# Patient Record
Sex: MALE | Race: OTHER | HISPANIC OR LATINO | Employment: OTHER | URBAN - METROPOLITAN AREA
[De-identification: names, ages, dates, MRNs, and addresses within clinical notes are randomized per-mention and may not be internally consistent; named-entity substitution may affect disease eponyms.]

---

## 2024-08-31 ENCOUNTER — HOSPITAL ENCOUNTER (OUTPATIENT)
Dept: RADIOLOGY | Facility: MEDICAL CENTER | Age: 49
End: 2024-08-31

## 2024-08-31 ENCOUNTER — HOSPITAL ENCOUNTER (INPATIENT)
Facility: MEDICAL CENTER | Age: 49
End: 2024-08-31
Attending: STUDENT IN AN ORGANIZED HEALTH CARE EDUCATION/TRAINING PROGRAM | Admitting: STUDENT IN AN ORGANIZED HEALTH CARE EDUCATION/TRAINING PROGRAM
Payer: COMMERCIAL

## 2024-08-31 ENCOUNTER — TELEPHONE (OUTPATIENT)
Dept: CARDIOLOGY | Facility: MEDICAL CENTER | Age: 49
End: 2024-08-31

## 2024-08-31 ENCOUNTER — APPOINTMENT (OUTPATIENT)
Dept: RADIOLOGY | Facility: MEDICAL CENTER | Age: 49
DRG: 322 | End: 2024-08-31
Attending: STUDENT IN AN ORGANIZED HEALTH CARE EDUCATION/TRAINING PROGRAM

## 2024-08-31 VITALS
RESPIRATION RATE: 18 BRPM | HEART RATE: 77 BPM | WEIGHT: 155.87 LBS | TEMPERATURE: 98.1 F | DIASTOLIC BLOOD PRESSURE: 97 MMHG | HEIGHT: 63 IN | OXYGEN SATURATION: 93 % | BODY MASS INDEX: 27.62 KG/M2 | SYSTOLIC BLOOD PRESSURE: 143 MMHG

## 2024-08-31 DIAGNOSIS — I21.4 NSTEMI (NON-ST ELEVATED MYOCARDIAL INFARCTION) (HCC): ICD-10-CM

## 2024-08-31 DIAGNOSIS — I24.9 ACUTE CORONARY SYNDROME (HCC): ICD-10-CM

## 2024-08-31 PROBLEM — I10 HYPERTENSION: Status: ACTIVE | Noted: 2024-08-31

## 2024-08-31 PROBLEM — Z71.6 TOBACCO ABUSE COUNSELING: Status: ACTIVE | Noted: 2024-08-31

## 2024-08-31 LAB
ALBUMIN SERPL BCP-MCNC: 3.9 G/DL (ref 3.2–4.9)
ALBUMIN/GLOB SERPL: 1 G/DL
ALP SERPL-CCNC: 96 U/L (ref 30–99)
ALT SERPL-CCNC: 32 U/L (ref 2–50)
AMPHET UR QL SCN: NEGATIVE
ANION GAP SERPL CALC-SCNC: 12 MMOL/L (ref 7–16)
APPEARANCE UR: CLEAR
APTT PPP: 25.1 SEC (ref 24.7–36)
AST SERPL-CCNC: 26 U/L (ref 12–45)
BARBITURATES UR QL SCN: NEGATIVE
BASOPHILS # BLD AUTO: 0.6 % (ref 0–1.8)
BASOPHILS # BLD: 0.06 K/UL (ref 0–0.12)
BENZODIAZ UR QL SCN: POSITIVE
BILIRUB SERPL-MCNC: 0.3 MG/DL (ref 0.1–1.5)
BILIRUB UR QL STRIP.AUTO: NEGATIVE
BUN SERPL-MCNC: 14 MG/DL (ref 8–22)
BZE UR QL SCN: NEGATIVE
CALCIUM ALBUM COR SERPL-MCNC: 9.4 MG/DL (ref 8.5–10.5)
CALCIUM SERPL-MCNC: 9.3 MG/DL (ref 8.5–10.5)
CANNABINOIDS UR QL SCN: NEGATIVE
CHLORIDE SERPL-SCNC: 106 MMOL/L (ref 96–112)
CO2 SERPL-SCNC: 23 MMOL/L (ref 20–33)
COLOR UR: YELLOW
CREAT SERPL-MCNC: 0.78 MG/DL (ref 0.5–1.4)
EKG IMPRESSION: NORMAL
EOSINOPHIL # BLD AUTO: 0.47 K/UL (ref 0–0.51)
EOSINOPHIL NFR BLD: 4.7 % (ref 0–6.9)
ERYTHROCYTE [DISTWIDTH] IN BLOOD BY AUTOMATED COUNT: 43.1 FL (ref 35.9–50)
FENTANYL UR QL: NEGATIVE
GFR SERPLBLD CREATININE-BSD FMLA CKD-EPI: 109 ML/MIN/1.73 M 2
GLOBULIN SER CALC-MCNC: 3.8 G/DL (ref 1.9–3.5)
GLUCOSE SERPL-MCNC: 96 MG/DL (ref 65–99)
GLUCOSE UR STRIP.AUTO-MCNC: NEGATIVE MG/DL
HCT VFR BLD AUTO: 44.5 % (ref 42–52)
HGB BLD-MCNC: 15.3 G/DL (ref 14–18)
IMM GRANULOCYTES # BLD AUTO: 0.04 K/UL (ref 0–0.11)
IMM GRANULOCYTES NFR BLD AUTO: 0.4 % (ref 0–0.9)
INR PPP: 0.98 (ref 0.87–1.13)
KETONES UR STRIP.AUTO-MCNC: NEGATIVE MG/DL
LEUKOCYTE ESTERASE UR QL STRIP.AUTO: NEGATIVE
LYMPHOCYTES # BLD AUTO: 1.81 K/UL (ref 1–4.8)
LYMPHOCYTES NFR BLD: 17.9 % (ref 22–41)
MCH RBC QN AUTO: 30.8 PG (ref 27–33)
MCHC RBC AUTO-ENTMCNC: 34.4 G/DL (ref 32.3–36.5)
MCV RBC AUTO: 89.7 FL (ref 81.4–97.8)
METHADONE UR QL SCN: NEGATIVE
MICRO URNS: NORMAL
MONOCYTES # BLD AUTO: 0.81 K/UL (ref 0–0.85)
MONOCYTES NFR BLD AUTO: 8 % (ref 0–13.4)
NEUTROPHILS # BLD AUTO: 6.9 K/UL (ref 1.82–7.42)
NEUTROPHILS NFR BLD: 68.4 % (ref 44–72)
NITRITE UR QL STRIP.AUTO: NEGATIVE
NRBC # BLD AUTO: 0 K/UL
NRBC BLD-RTO: 0 /100 WBC (ref 0–0.2)
NT-PROBNP SERPL IA-MCNC: <36 PG/ML (ref 0–125)
OPIATES UR QL SCN: NEGATIVE
OXYCODONE UR QL SCN: NEGATIVE
PCP UR QL SCN: NEGATIVE
PH UR STRIP.AUTO: 7.5 [PH] (ref 5–8)
PLATELET # BLD AUTO: 279 K/UL (ref 164–446)
PMV BLD AUTO: 9 FL (ref 9–12.9)
POTASSIUM SERPL-SCNC: 4 MMOL/L (ref 3.6–5.5)
PROPOXYPH UR QL SCN: NEGATIVE
PROT SERPL-MCNC: 7.7 G/DL (ref 6–8.2)
PROT UR QL STRIP: NEGATIVE MG/DL
PROTHROMBIN TIME: 13.1 SEC (ref 12–14.6)
RBC # BLD AUTO: 4.96 M/UL (ref 4.7–6.1)
RBC UR QL AUTO: NEGATIVE
SODIUM SERPL-SCNC: 141 MMOL/L (ref 135–145)
SP GR UR STRIP.AUTO: 1.02
TROPONIN T SERPL-MCNC: 82 NG/L (ref 6–19)
TROPONIN T SERPL-MCNC: 87 NG/L (ref 6–19)
UFH PPP CHRO-ACNC: <0.1 IU/ML
UROBILINOGEN UR STRIP.AUTO-MCNC: 0.2 MG/DL
WBC # BLD AUTO: 10.1 K/UL (ref 4.8–10.8)

## 2024-08-31 PROCEDURE — 93005 ELECTROCARDIOGRAM TRACING: CPT

## 2024-08-31 PROCEDURE — 85520 HEPARIN ASSAY: CPT

## 2024-08-31 PROCEDURE — 700111 HCHG RX REV CODE 636 W/ 250 OVERRIDE (IP): Performed by: STUDENT IN AN ORGANIZED HEALTH CARE EDUCATION/TRAINING PROGRAM

## 2024-08-31 PROCEDURE — 700102 HCHG RX REV CODE 250 W/ 637 OVERRIDE(OP): Performed by: INTERNAL MEDICINE

## 2024-08-31 PROCEDURE — 36415 COLL VENOUS BLD VENIPUNCTURE: CPT

## 2024-08-31 PROCEDURE — 700105 HCHG RX REV CODE 258: Performed by: STUDENT IN AN ORGANIZED HEALTH CARE EDUCATION/TRAINING PROGRAM

## 2024-08-31 PROCEDURE — 99407 BEHAV CHNG SMOKING > 10 MIN: CPT

## 2024-08-31 PROCEDURE — 83880 ASSAY OF NATRIURETIC PEPTIDE: CPT

## 2024-08-31 PROCEDURE — 85730 THROMBOPLASTIN TIME PARTIAL: CPT

## 2024-08-31 PROCEDURE — 84484 ASSAY OF TROPONIN QUANT: CPT

## 2024-08-31 PROCEDURE — 80307 DRUG TEST PRSMV CHEM ANLYZR: CPT

## 2024-08-31 PROCEDURE — 71045 X-RAY EXAM CHEST 1 VIEW: CPT

## 2024-08-31 PROCEDURE — 85025 COMPLETE CBC W/AUTO DIFF WBC: CPT

## 2024-08-31 PROCEDURE — 770020 HCHG ROOM/CARE - TELE (206)

## 2024-08-31 PROCEDURE — 99407 BEHAV CHNG SMOKING > 10 MIN: CPT | Performed by: STUDENT IN AN ORGANIZED HEALTH CARE EDUCATION/TRAINING PROGRAM

## 2024-08-31 PROCEDURE — 81003 URINALYSIS AUTO W/O SCOPE: CPT

## 2024-08-31 PROCEDURE — A9270 NON-COVERED ITEM OR SERVICE: HCPCS | Performed by: INTERNAL MEDICINE

## 2024-08-31 PROCEDURE — 99285 EMERGENCY DEPT VISIT HI MDM: CPT

## 2024-08-31 PROCEDURE — 80053 COMPREHEN METABOLIC PANEL: CPT

## 2024-08-31 PROCEDURE — 99223 1ST HOSP IP/OBS HIGH 75: CPT | Mod: 25 | Performed by: STUDENT IN AN ORGANIZED HEALTH CARE EDUCATION/TRAINING PROGRAM

## 2024-08-31 PROCEDURE — 85610 PROTHROMBIN TIME: CPT

## 2024-08-31 PROCEDURE — 99222 1ST HOSP IP/OBS MODERATE 55: CPT | Performed by: INTERNAL MEDICINE

## 2024-08-31 RX ORDER — METOPROLOL TARTRATE 25 MG/1
25 TABLET, FILM COATED ORAL TWICE DAILY
Status: DISCONTINUED | OUTPATIENT
Start: 2024-08-31 | End: 2024-09-02

## 2024-08-31 RX ORDER — MORPHINE SULFATE 4 MG/ML
2-4 INJECTION INTRAVENOUS
Status: DISCONTINUED | OUTPATIENT
Start: 2024-08-31 | End: 2024-09-02 | Stop reason: HOSPADM

## 2024-08-31 RX ORDER — ATORVASTATIN CALCIUM 40 MG/1
40 TABLET, FILM COATED ORAL
Status: DISCONTINUED | OUTPATIENT
Start: 2024-08-31 | End: 2024-09-02 | Stop reason: HOSPADM

## 2024-08-31 RX ORDER — NICOTINE 21 MG/24HR
21 PATCH, TRANSDERMAL 24 HOURS TRANSDERMAL
Status: DISCONTINUED | OUTPATIENT
Start: 2024-09-01 | End: 2024-09-02 | Stop reason: HOSPADM

## 2024-08-31 RX ORDER — NITROGLYCERIN 0.4 MG/1
0.4 TABLET SUBLINGUAL
Status: DISCONTINUED | OUTPATIENT
Start: 2024-08-31 | End: 2024-09-02 | Stop reason: HOSPADM

## 2024-08-31 RX ORDER — AMOXICILLIN 250 MG
2 CAPSULE ORAL EVERY EVENING
Status: DISCONTINUED | OUTPATIENT
Start: 2024-08-31 | End: 2024-09-02 | Stop reason: HOSPADM

## 2024-08-31 RX ORDER — LABETALOL HYDROCHLORIDE 5 MG/ML
10 INJECTION, SOLUTION INTRAVENOUS EVERY 4 HOURS PRN
Status: DISCONTINUED | OUTPATIENT
Start: 2024-08-31 | End: 2024-09-02 | Stop reason: HOSPADM

## 2024-08-31 RX ORDER — HEPARIN SODIUM 5000 [USP'U]/100ML
0-30 INJECTION, SOLUTION INTRAVENOUS CONTINUOUS
Status: DISCONTINUED | OUTPATIENT
Start: 2024-08-31 | End: 2024-09-01

## 2024-08-31 RX ORDER — PROCHLORPERAZINE EDISYLATE 5 MG/ML
5-10 INJECTION INTRAMUSCULAR; INTRAVENOUS EVERY 4 HOURS PRN
Status: DISCONTINUED | OUTPATIENT
Start: 2024-08-31 | End: 2024-09-02 | Stop reason: HOSPADM

## 2024-08-31 RX ORDER — PROMETHAZINE HYDROCHLORIDE 25 MG/1
12.5-25 SUPPOSITORY RECTAL EVERY 4 HOURS PRN
Status: DISCONTINUED | OUTPATIENT
Start: 2024-08-31 | End: 2024-09-02 | Stop reason: HOSPADM

## 2024-08-31 RX ORDER — ASPIRIN 81 MG/1
81 TABLET ORAL DAILY
Status: DISCONTINUED | OUTPATIENT
Start: 2024-09-01 | End: 2024-08-31

## 2024-08-31 RX ORDER — HYDRALAZINE HYDROCHLORIDE 20 MG/ML
10 INJECTION INTRAMUSCULAR; INTRAVENOUS EVERY 4 HOURS PRN
Status: DISCONTINUED | OUTPATIENT
Start: 2024-08-31 | End: 2024-09-02 | Stop reason: HOSPADM

## 2024-08-31 RX ORDER — HEPARIN SODIUM 1000 [USP'U]/ML
30 INJECTION, SOLUTION INTRAVENOUS; SUBCUTANEOUS PRN
Status: DISCONTINUED | OUTPATIENT
Start: 2024-08-31 | End: 2024-09-01

## 2024-08-31 RX ORDER — ONDANSETRON 2 MG/ML
4 INJECTION INTRAMUSCULAR; INTRAVENOUS EVERY 4 HOURS PRN
Status: DISCONTINUED | OUTPATIENT
Start: 2024-08-31 | End: 2024-09-02 | Stop reason: HOSPADM

## 2024-08-31 RX ORDER — POLYETHYLENE GLYCOL 3350 17 G/17G
1 POWDER, FOR SOLUTION ORAL
Status: DISCONTINUED | OUTPATIENT
Start: 2024-08-31 | End: 2024-09-02 | Stop reason: HOSPADM

## 2024-08-31 RX ORDER — SODIUM CHLORIDE 9 MG/ML
INJECTION, SOLUTION INTRAVENOUS CONTINUOUS
Status: DISCONTINUED | OUTPATIENT
Start: 2024-08-31 | End: 2024-09-01

## 2024-08-31 RX ORDER — HEPARIN SODIUM 1000 [USP'U]/ML
60 INJECTION, SOLUTION INTRAVENOUS; SUBCUTANEOUS ONCE
Status: COMPLETED | OUTPATIENT
Start: 2024-08-31 | End: 2024-08-31

## 2024-08-31 RX ORDER — SODIUM CHLORIDE, SODIUM LACTATE, POTASSIUM CHLORIDE, AND CALCIUM CHLORIDE .6; .31; .03; .02 G/100ML; G/100ML; G/100ML; G/100ML
500 INJECTION, SOLUTION INTRAVENOUS
Status: DISCONTINUED | OUTPATIENT
Start: 2024-08-31 | End: 2024-09-02 | Stop reason: HOSPADM

## 2024-08-31 RX ORDER — ONDANSETRON 4 MG/1
4 TABLET, ORALLY DISINTEGRATING ORAL EVERY 4 HOURS PRN
Status: DISCONTINUED | OUTPATIENT
Start: 2024-08-31 | End: 2024-09-02 | Stop reason: HOSPADM

## 2024-08-31 RX ORDER — ACETAMINOPHEN 325 MG/1
650 TABLET ORAL EVERY 6 HOURS PRN
Status: DISCONTINUED | OUTPATIENT
Start: 2024-08-31 | End: 2024-09-02 | Stop reason: HOSPADM

## 2024-08-31 RX ORDER — PROMETHAZINE HYDROCHLORIDE 25 MG/1
12.5-25 TABLET ORAL EVERY 4 HOURS PRN
Status: DISCONTINUED | OUTPATIENT
Start: 2024-08-31 | End: 2024-09-02 | Stop reason: HOSPADM

## 2024-08-31 RX ORDER — ASPIRIN 81 MG/1
81 TABLET ORAL DAILY
Status: DISCONTINUED | OUTPATIENT
Start: 2024-09-01 | End: 2024-09-02 | Stop reason: HOSPADM

## 2024-08-31 RX ADMIN — METOPROLOL TARTRATE 25 MG: 25 TABLET, FILM COATED ORAL at 21:59

## 2024-08-31 RX ADMIN — HEPARIN SODIUM 12 UNITS/KG/HR: 5000 INJECTION, SOLUTION INTRAVENOUS at 21:51

## 2024-08-31 RX ADMIN — ATORVASTATIN CALCIUM 40 MG: 40 TABLET, FILM COATED ORAL at 21:59

## 2024-08-31 RX ADMIN — SODIUM CHLORIDE: 9 INJECTION, SOLUTION INTRAVENOUS at 22:20

## 2024-08-31 RX ADMIN — HEPARIN SODIUM 4000 UNITS: 1000 INJECTION, SOLUTION INTRAVENOUS; SUBCUTANEOUS at 21:49

## 2024-08-31 SDOH — ECONOMIC STABILITY: TRANSPORTATION INSECURITY
IN THE PAST 12 MONTHS, HAS THE LACK OF TRANSPORTATION KEPT YOU FROM MEDICAL APPOINTMENTS OR FROM GETTING MEDICATIONS?: NO

## 2024-08-31 SDOH — ECONOMIC STABILITY: TRANSPORTATION INSECURITY
IN THE PAST 12 MONTHS, HAS LACK OF RELIABLE TRANSPORTATION KEPT YOU FROM MEDICAL APPOINTMENTS, MEETINGS, WORK OR FROM GETTING THINGS NEEDED FOR DAILY LIVING?: NO

## 2024-08-31 ASSESSMENT — COGNITIVE AND FUNCTIONAL STATUS - GENERAL
SUGGESTED CMS G CODE MODIFIER MOBILITY: CH
MOBILITY SCORE: 24
DAILY ACTIVITIY SCORE: 24
SUGGESTED CMS G CODE MODIFIER DAILY ACTIVITY: CH

## 2024-08-31 ASSESSMENT — LIFESTYLE VARIABLES
ON A TYPICAL DAY WHEN YOU DRINK ALCOHOL HOW MANY DRINKS DO YOU HAVE: 0
DO YOU DRINK ALCOHOL: NO
TOTAL SCORE: 0
SUBSTANCE_ABUSE: 0
HAVE YOU EVER FELT YOU SHOULD CUT DOWN ON YOUR DRINKING: NO
AVERAGE NUMBER OF DAYS PER WEEK YOU HAVE A DRINK CONTAINING ALCOHOL: 0
EVER HAD A DRINK FIRST THING IN THE MORNING TO STEADY YOUR NERVES TO GET RID OF A HANGOVER: NO
TOTAL SCORE: 0
TOTAL SCORE: 0
ALCOHOL_USE: NO
EVER FELT BAD OR GUILTY ABOUT YOUR DRINKING: NO
HAVE PEOPLE ANNOYED YOU BY CRITICIZING YOUR DRINKING: NO
HOW MANY TIMES IN THE PAST YEAR HAVE YOU HAD 5 OR MORE DRINKS IN A DAY: 0
CONSUMPTION TOTAL: NEGATIVE

## 2024-08-31 ASSESSMENT — SOCIAL DETERMINANTS OF HEALTH (SDOH)
WITHIN THE PAST 12 MONTHS, THE FOOD YOU BOUGHT JUST DIDN'T LAST AND YOU DIDN'T HAVE MONEY TO GET MORE: NEVER TRUE
WITHIN THE PAST 12 MONTHS, YOU WORRIED THAT YOUR FOOD WOULD RUN OUT BEFORE YOU GOT THE MONEY TO BUY MORE: NEVER TRUE
WITHIN THE LAST YEAR, HAVE YOU BEEN KICKED, HIT, SLAPPED, OR OTHERWISE PHYSICALLY HURT BY YOUR PARTNER OR EX-PARTNER?: NO
WITHIN THE LAST YEAR, HAVE TO BEEN RAPED OR FORCED TO HAVE ANY KIND OF SEXUAL ACTIVITY BY YOUR PARTNER OR EX-PARTNER?: NO
IN THE PAST 12 MONTHS, HAS THE ELECTRIC, GAS, OIL, OR WATER COMPANY THREATENED TO SHUT OFF SERVICE IN YOUR HOME?: NO
WITHIN THE LAST YEAR, HAVE YOU BEEN AFRAID OF YOUR PARTNER OR EX-PARTNER?: NO
WITHIN THE LAST YEAR, HAVE YOU BEEN HUMILIATED OR EMOTIONALLY ABUSED IN OTHER WAYS BY YOUR PARTNER OR EX-PARTNER?: NO

## 2024-08-31 ASSESSMENT — ENCOUNTER SYMPTOMS
DIZZINESS: 0
ABDOMINAL PAIN: 0
DEPRESSION: 0
HEARTBURN: 0
HEADACHES: 0
BLURRED VISION: 0
CHILLS: 0
MYALGIAS: 0
FEVER: 0
BRUISES/BLEEDS EASILY: 0
PALPITATIONS: 0
PALPITATIONS: 1
SHORTNESS OF BREATH: 0
WEAKNESS: 1
DIAPHORESIS: 1
NAUSEA: 0
COUGH: 0
VOMITING: 0
DOUBLE VISION: 0
CHEST TIGHTNESS: 0

## 2024-08-31 ASSESSMENT — HEART SCORE
ECG: NON-SPECIFIC REPOLARIZATION DISTURBANCE
RISK FACTORS: 1-2 RISK FACTORS
HISTORY: HIGHLY SUSPICIOUS
TROPONIN: GREATER THAN OR EQUAL TO 3 TIMES NORMAL LIMIT
HEART SCORE: 7
AGE: 45-64

## 2024-08-31 ASSESSMENT — FIBROSIS 4 INDEX: FIB4 SCORE: 0.81

## 2024-08-31 ASSESSMENT — PATIENT HEALTH QUESTIONNAIRE - PHQ9
2. FEELING DOWN, DEPRESSED, IRRITABLE, OR HOPELESS: NOT AT ALL
1. LITTLE INTEREST OR PLEASURE IN DOING THINGS: NOT AT ALL
SUM OF ALL RESPONSES TO PHQ9 QUESTIONS 1 AND 2: 0

## 2024-08-31 ASSESSMENT — PAIN DESCRIPTION - PAIN TYPE: TYPE: ACUTE PAIN

## 2024-09-01 ENCOUNTER — APPOINTMENT (OUTPATIENT)
Dept: CARDIOLOGY | Facility: MEDICAL CENTER | Age: 49
DRG: 322 | End: 2024-09-01
Attending: INTERNAL MEDICINE

## 2024-09-01 ENCOUNTER — APPOINTMENT (OUTPATIENT)
Dept: CARDIOLOGY | Facility: MEDICAL CENTER | Age: 49
DRG: 322 | End: 2024-09-01
Attending: INTERNAL MEDICINE
Payer: COMMERCIAL

## 2024-09-01 LAB
ACT BLD: 238 SEC (ref 74–137)
ACT BLD: 300 SEC (ref 74–137)
ALBUMIN SERPL BCP-MCNC: 3.7 G/DL (ref 3.2–4.9)
ALBUMIN/GLOB SERPL: 1.1 G/DL
ALP SERPL-CCNC: 92 U/L (ref 30–99)
ALT SERPL-CCNC: 28 U/L (ref 2–50)
ANION GAP SERPL CALC-SCNC: 11 MMOL/L (ref 7–16)
AST SERPL-CCNC: 27 U/L (ref 12–45)
BASOPHILS # BLD AUTO: 0.8 % (ref 0–1.8)
BASOPHILS # BLD: 0.08 K/UL (ref 0–0.12)
BILIRUB SERPL-MCNC: 0.3 MG/DL (ref 0.1–1.5)
BUN SERPL-MCNC: 14 MG/DL (ref 8–22)
CALCIUM ALBUM COR SERPL-MCNC: 9.3 MG/DL (ref 8.5–10.5)
CALCIUM SERPL-MCNC: 9.1 MG/DL (ref 8.5–10.5)
CHLORIDE SERPL-SCNC: 106 MMOL/L (ref 96–112)
CHOLEST SERPL-MCNC: 215 MG/DL (ref 100–199)
CO2 SERPL-SCNC: 22 MMOL/L (ref 20–33)
CREAT SERPL-MCNC: 0.78 MG/DL (ref 0.5–1.4)
EOSINOPHIL # BLD AUTO: 0.69 K/UL (ref 0–0.51)
EOSINOPHIL NFR BLD: 7.1 % (ref 0–6.9)
ERYTHROCYTE [DISTWIDTH] IN BLOOD BY AUTOMATED COUNT: 43.5 FL (ref 35.9–50)
EST. AVERAGE GLUCOSE BLD GHB EST-MCNC: 120 MG/DL
GFR SERPLBLD CREATININE-BSD FMLA CKD-EPI: 109 ML/MIN/1.73 M 2
GLOBULIN SER CALC-MCNC: 3.5 G/DL (ref 1.9–3.5)
GLUCOSE SERPL-MCNC: 108 MG/DL (ref 65–99)
HBA1C MFR BLD: 5.8 % (ref 4–5.6)
HCT VFR BLD AUTO: 43.6 % (ref 42–52)
HDLC SERPL-MCNC: 46 MG/DL
HGB BLD-MCNC: 14.6 G/DL (ref 14–18)
IMM GRANULOCYTES # BLD AUTO: 0.07 K/UL (ref 0–0.11)
IMM GRANULOCYTES NFR BLD AUTO: 0.7 % (ref 0–0.9)
LDLC SERPL CALC-MCNC: 137 MG/DL
LV EJECT FRACT MOD 2C 99903: 57.75
LV EJECT FRACT MOD 4C 99902: 69.88
LV EJECT FRACT MOD BP 99901: 63.72
LYMPHOCYTES # BLD AUTO: 2.15 K/UL (ref 1–4.8)
LYMPHOCYTES NFR BLD: 22.1 % (ref 22–41)
MAGNESIUM SERPL-MCNC: 2.1 MG/DL (ref 1.5–2.5)
MCH RBC QN AUTO: 30.1 PG (ref 27–33)
MCHC RBC AUTO-ENTMCNC: 33.5 G/DL (ref 32.3–36.5)
MCV RBC AUTO: 89.9 FL (ref 81.4–97.8)
MONOCYTES # BLD AUTO: 0.87 K/UL (ref 0–0.85)
MONOCYTES NFR BLD AUTO: 8.9 % (ref 0–13.4)
NEUTROPHILS # BLD AUTO: 5.89 K/UL (ref 1.82–7.42)
NEUTROPHILS NFR BLD: 60.4 % (ref 44–72)
NRBC # BLD AUTO: 0 K/UL
NRBC BLD-RTO: 0 /100 WBC (ref 0–0.2)
PHOSPHATE SERPL-MCNC: 3.8 MG/DL (ref 2.5–4.5)
PLATELET # BLD AUTO: 261 K/UL (ref 164–446)
PMV BLD AUTO: 9.2 FL (ref 9–12.9)
POTASSIUM SERPL-SCNC: 3.7 MMOL/L (ref 3.6–5.5)
PROT SERPL-MCNC: 7.2 G/DL (ref 6–8.2)
RBC # BLD AUTO: 4.85 M/UL (ref 4.7–6.1)
SODIUM SERPL-SCNC: 139 MMOL/L (ref 135–145)
TRIGL SERPL-MCNC: 159 MG/DL (ref 0–149)
TROPONIN T SERPL-MCNC: 81 NG/L (ref 6–19)
UFH PPP CHRO-ACNC: 0.23 IU/ML
WBC # BLD AUTO: 9.8 K/UL (ref 4.8–10.8)

## 2024-09-01 PROCEDURE — 4A023N7 MEASUREMENT OF CARDIAC SAMPLING AND PRESSURE, LEFT HEART, PERCUTANEOUS APPROACH: ICD-10-PCS | Performed by: INTERNAL MEDICINE

## 2024-09-01 PROCEDURE — 93306 TTE W/DOPPLER COMPLETE: CPT | Mod: 26 | Performed by: INTERNAL MEDICINE

## 2024-09-01 PROCEDURE — 80061 LIPID PANEL: CPT

## 2024-09-01 PROCEDURE — 83735 ASSAY OF MAGNESIUM: CPT

## 2024-09-01 PROCEDURE — 99153 MOD SED SAME PHYS/QHP EA: CPT

## 2024-09-01 PROCEDURE — 85347 COAGULATION TIME ACTIVATED: CPT

## 2024-09-01 PROCEDURE — 99291 CRITICAL CARE FIRST HOUR: CPT | Performed by: INTERNAL MEDICINE

## 2024-09-01 PROCEDURE — 700101 HCHG RX REV CODE 250

## 2024-09-01 PROCEDURE — 700111 HCHG RX REV CODE 636 W/ 250 OVERRIDE (IP)

## 2024-09-01 PROCEDURE — 83036 HEMOGLOBIN GLYCOSYLATED A1C: CPT

## 2024-09-01 PROCEDURE — B2151ZZ FLUOROSCOPY OF LEFT HEART USING LOW OSMOLAR CONTRAST: ICD-10-PCS | Performed by: INTERNAL MEDICINE

## 2024-09-01 PROCEDURE — 85520 HEPARIN ASSAY: CPT

## 2024-09-01 PROCEDURE — 700117 HCHG RX CONTRAST REV CODE 255: Performed by: INTERNAL MEDICINE

## 2024-09-01 PROCEDURE — B241ZZ3 ULTRASONOGRAPHY OF MULTIPLE CORONARY ARTERIES, INTRAVASCULAR: ICD-10-PCS | Performed by: INTERNAL MEDICINE

## 2024-09-01 PROCEDURE — 36415 COLL VENOUS BLD VENIPUNCTURE: CPT

## 2024-09-01 PROCEDURE — A9270 NON-COVERED ITEM OR SERVICE: HCPCS

## 2024-09-01 PROCEDURE — 700102 HCHG RX REV CODE 250 W/ 637 OVERRIDE(OP)

## 2024-09-01 PROCEDURE — A9270 NON-COVERED ITEM OR SERVICE: HCPCS | Performed by: INTERNAL MEDICINE

## 2024-09-01 PROCEDURE — 700111 HCHG RX REV CODE 636 W/ 250 OVERRIDE (IP): Mod: JZ | Performed by: STUDENT IN AN ORGANIZED HEALTH CARE EDUCATION/TRAINING PROGRAM

## 2024-09-01 PROCEDURE — B2111ZZ FLUOROSCOPY OF MULTIPLE CORONARY ARTERIES USING LOW OSMOLAR CONTRAST: ICD-10-PCS | Performed by: INTERNAL MEDICINE

## 2024-09-01 PROCEDURE — 84100 ASSAY OF PHOSPHORUS: CPT

## 2024-09-01 PROCEDURE — 85025 COMPLETE CBC W/AUTO DIFF WBC: CPT

## 2024-09-01 PROCEDURE — 027034Z DILATION OF CORONARY ARTERY, ONE ARTERY WITH DRUG-ELUTING INTRALUMINAL DEVICE, PERCUTANEOUS APPROACH: ICD-10-PCS | Performed by: INTERNAL MEDICINE

## 2024-09-01 PROCEDURE — 84484 ASSAY OF TROPONIN QUANT: CPT

## 2024-09-01 PROCEDURE — 93306 TTE W/DOPPLER COMPLETE: CPT

## 2024-09-01 PROCEDURE — 700102 HCHG RX REV CODE 250 W/ 637 OVERRIDE(OP): Performed by: INTERNAL MEDICINE

## 2024-09-01 PROCEDURE — 770020 HCHG ROOM/CARE - TELE (206)

## 2024-09-01 PROCEDURE — 80053 COMPREHEN METABOLIC PANEL: CPT

## 2024-09-01 PROCEDURE — 700102 HCHG RX REV CODE 250 W/ 637 OVERRIDE(OP): Performed by: STUDENT IN AN ORGANIZED HEALTH CARE EDUCATION/TRAINING PROGRAM

## 2024-09-01 PROCEDURE — A9270 NON-COVERED ITEM OR SERVICE: HCPCS | Performed by: STUDENT IN AN ORGANIZED HEALTH CARE EDUCATION/TRAINING PROGRAM

## 2024-09-01 RX ORDER — PRASUGREL 10 MG/1
10 TABLET, FILM COATED ORAL DAILY
Status: DISCONTINUED | OUTPATIENT
Start: 2024-09-02 | End: 2024-09-02 | Stop reason: HOSPADM

## 2024-09-01 RX ORDER — PRASUGREL 10 MG/1
TABLET, FILM COATED ORAL
Status: COMPLETED
Start: 2024-09-01 | End: 2024-09-01

## 2024-09-01 RX ORDER — VERAPAMIL HYDROCHLORIDE 2.5 MG/ML
INJECTION, SOLUTION INTRAVENOUS
Status: COMPLETED
Start: 2024-09-01 | End: 2024-09-01

## 2024-09-01 RX ORDER — LIDOCAINE HYDROCHLORIDE 20 MG/ML
INJECTION, SOLUTION INFILTRATION; PERINEURAL
Status: COMPLETED
Start: 2024-09-01 | End: 2024-09-01

## 2024-09-01 RX ORDER — HEPARIN SODIUM 200 [USP'U]/100ML
INJECTION, SOLUTION INTRAVENOUS
Status: COMPLETED
Start: 2024-09-01 | End: 2024-09-01

## 2024-09-01 RX ORDER — HEPARIN SODIUM 1000 [USP'U]/ML
INJECTION, SOLUTION INTRAVENOUS; SUBCUTANEOUS
Status: COMPLETED
Start: 2024-09-01 | End: 2024-09-01

## 2024-09-01 RX ORDER — MIDAZOLAM HYDROCHLORIDE 1 MG/ML
INJECTION INTRAMUSCULAR; INTRAVENOUS
Status: COMPLETED
Start: 2024-09-01 | End: 2024-09-01

## 2024-09-01 RX ORDER — LOSARTAN POTASSIUM 50 MG/1
50 TABLET ORAL EVERY EVENING
Status: DISCONTINUED | OUTPATIENT
Start: 2024-09-01 | End: 2024-09-02 | Stop reason: HOSPADM

## 2024-09-01 RX ADMIN — ATORVASTATIN CALCIUM 40 MG: 40 TABLET, FILM COATED ORAL at 20:14

## 2024-09-01 RX ADMIN — PRASUGREL 60 MG: 10 TABLET, FILM COATED ORAL at 11:24

## 2024-09-01 RX ADMIN — ACETAMINOPHEN 650 MG: 325 TABLET ORAL at 21:15

## 2024-09-01 RX ADMIN — LABETALOL HYDROCHLORIDE 10 MG: 5 INJECTION, SOLUTION INTRAVENOUS at 19:37

## 2024-09-01 RX ADMIN — METOPROLOL TARTRATE 25 MG: 25 TABLET, FILM COATED ORAL at 17:13

## 2024-09-01 RX ADMIN — METOPROLOL TARTRATE 25 MG: 25 TABLET, FILM COATED ORAL at 05:17

## 2024-09-01 RX ADMIN — SENNOSIDES AND DOCUSATE SODIUM 2 TABLET: 50; 8.6 TABLET ORAL at 17:13

## 2024-09-01 RX ADMIN — NITROGLYCERIN 0.5 INCH: 20 OINTMENT TOPICAL at 05:17

## 2024-09-01 RX ADMIN — HEPARIN SODIUM 2000 UNITS: 200 INJECTION, SOLUTION INTRAVENOUS at 10:26

## 2024-09-01 RX ADMIN — ONDANSETRON 4 MG: 2 INJECTION INTRAMUSCULAR; INTRAVENOUS at 21:14

## 2024-09-01 RX ADMIN — HEPARIN SODIUM 2000 UNITS: 1000 INJECTION, SOLUTION INTRAVENOUS; SUBCUTANEOUS at 05:12

## 2024-09-01 RX ADMIN — VERAPAMIL HYDROCHLORIDE 5 MG: 2.5 INJECTION, SOLUTION INTRAVENOUS at 10:26

## 2024-09-01 RX ADMIN — NITROGLYCERIN 0.5 INCH: 20 OINTMENT TOPICAL at 00:12

## 2024-09-01 RX ADMIN — LOSARTAN POTASSIUM 50 MG: 50 TABLET, FILM COATED ORAL at 17:13

## 2024-09-01 RX ADMIN — HUMAN ALBUMIN MICROSPHERES AND PERFLUTREN 3 ML: 10; .22 INJECTION, SOLUTION INTRAVENOUS at 18:15

## 2024-09-01 RX ADMIN — FENTANYL CITRATE 100 MCG: 50 INJECTION, SOLUTION INTRAMUSCULAR; INTRAVENOUS at 10:43

## 2024-09-01 RX ADMIN — ASPIRIN 81 MG: 81 TABLET, COATED ORAL at 05:17

## 2024-09-01 RX ADMIN — IOHEXOL 200 ML: 350 INJECTION, SOLUTION INTRAVENOUS at 11:19

## 2024-09-01 RX ADMIN — HYDRALAZINE HYDROCHLORIDE 10 MG: 20 INJECTION INTRAMUSCULAR; INTRAVENOUS at 20:14

## 2024-09-01 RX ADMIN — NITROGLYCERIN 10 ML: 20 INJECTION INTRAVENOUS at 10:26

## 2024-09-01 RX ADMIN — LIDOCAINE HYDROCHLORIDE: 20 INJECTION, SOLUTION INFILTRATION; PERINEURAL at 10:26

## 2024-09-01 RX ADMIN — MIDAZOLAM HYDROCHLORIDE 2 MG: 1 INJECTION, SOLUTION INTRAMUSCULAR; INTRAVENOUS at 10:43

## 2024-09-01 RX ADMIN — HEPARIN SODIUM: 1000 INJECTION, SOLUTION INTRAVENOUS; SUBCUTANEOUS at 10:26

## 2024-09-01 ASSESSMENT — ENCOUNTER SYMPTOMS
ORTHOPNEA: 0
NAUSEA: 0
NAUSEA: 1
MUSCULOSKELETAL NEGATIVE: 1
ABDOMINAL PAIN: 0
DIZZINESS: 0
SPUTUM PRODUCTION: 0
CHILLS: 0
HEADACHES: 0
BLOOD IN STOOL: 0
FEVER: 0
BRUISES/BLEEDS EASILY: 0
PSYCHIATRIC NEGATIVE: 1
MYALGIAS: 0
DEPRESSION: 0
CLAUDICATION: 0
VOMITING: 1
PND: 0
COUGH: 0
VOMITING: 0
PALPITATIONS: 0
WHEEZING: 0
WEAKNESS: 0
SHORTNESS OF BREATH: 0

## 2024-09-01 ASSESSMENT — PAIN DESCRIPTION - PAIN TYPE: TYPE: ACUTE PAIN

## 2024-09-01 ASSESSMENT — FIBROSIS 4 INDEX: FIB4 SCORE: 0.96

## 2024-09-01 NOTE — ED PROVIDER NOTES
"  ER Provider Note    Scribed for Rosalie Downs M.d. by Nissa Leavitt. 8/31/2024  8:32 PM    Primary Care Provider: No primary care provider noted.    CHIEF COMPLAINT  Chief Complaint   Patient presents with    Chest Pain     Pt states he has had 3 episodes of sudden onset mid sternal CP that radiates to his R shoulder and arm since yesterday -sob     LIMITATION TO HISTORY   Select: : None    HPI/ROS  OUTSIDE HISTORIAN(S):  None    EXTERNAL RECORDS REVIEWED  Other Reviewed records from [place] earlier today.    Patrick Sagastume is a 48 y.o. male who presents to the ED via EMS as a transfer from [place] for chest pain onset last night. The patient states his episode last night lasted for eight minutes and ran across his chest and down his right arm before resolving. He reports he then went hiking in [place] earlier today and had another onset of same symptoms for 5-10 minutes and again seven hours ago. He endorses additional arm weakness, shortness of breath, and diaphoresis during these episodes. The patient notes his chest pain has since resolved. He denies any history of hypertension but notes a family history of heart attack. He admits to cigarette use.    PAST MEDICAL HISTORY  History reviewed. No pertinent past medical history.    SURGICAL HISTORY  History reviewed. No pertinent surgical history.    FAMILY HISTORY  Myocardial Infarction - Grandmother    SOCIAL HISTORY  Reports cigarette use    CURRENT MEDICATIONS  Previous Medications    No medications noted       ALLERGIES  Patient has no known allergies.    PHYSICAL EXAM  BP (!) 137/95   Pulse 75   Temp 36.1 °C (97 °F) (Temporal)   Resp 14   Ht 1.6 m (5' 2.99\")   Wt 79.4 kg (175 lb)   SpO2 94%   BMI 31.01 kg/m²   Constitutional: Alert in no apparent distress.  HENT: No signs of trauma, Bilateral external ears normal, Nose normal.   Eyes: Pupils are equal and reactive, Conjunctiva normal, Non-icteric.   Neck: No stridor. "   Cardiovascular: Regular rate and rhythm, no murmurs.   Thorax & Lungs: Normal breath sounds, No respiratory distress, No wheezing, No chest tenderness.   Abdomen: Bowel sounds normal, Soft, No tenderness, No masses, No peritoneal signs.  Skin: Warm, Dry, No erythema, No rash.   Musculoskeletal:  No major deformities noted.  Neurologic: Alert, moving all extremities without difficulty, no focal deficits.     DIAGNOSTIC STUDIES & PROCEDURES    Labs:   Results for orders placed or performed during the hospital encounter of 08/31/24   CBC with Differential   Result Value Ref Range    WBC 10.1 4.8 - 10.8 K/uL    RBC 4.96 4.70 - 6.10 M/uL    Hemoglobin 15.3 14.0 - 18.0 g/dL    Hematocrit 44.5 42.0 - 52.0 %    MCV 89.7 81.4 - 97.8 fL    MCH 30.8 27.0 - 33.0 pg    MCHC 34.4 32.3 - 36.5 g/dL    RDW 43.1 35.9 - 50.0 fL    Platelet Count 279 164 - 446 K/uL    MPV 9.0 9.0 - 12.9 fL    Neutrophils-Polys 68.40 44.00 - 72.00 %    Lymphocytes 17.90 (L) 22.00 - 41.00 %    Monocytes 8.00 0.00 - 13.40 %    Eosinophils 4.70 0.00 - 6.90 %    Basophils 0.60 0.00 - 1.80 %    Immature Granulocytes 0.40 0.00 - 0.90 %    Nucleated RBC 0.00 0.00 - 0.20 /100 WBC    Neutrophils (Absolute) 6.90 1.82 - 7.42 K/uL    Lymphs (Absolute) 1.81 1.00 - 4.80 K/uL    Monos (Absolute) 0.81 0.00 - 0.85 K/uL    Eos (Absolute) 0.47 0.00 - 0.51 K/uL    Baso (Absolute) 0.06 0.00 - 0.12 K/uL    Immature Granulocytes (abs) 0.04 0.00 - 0.11 K/uL    NRBC (Absolute) 0.00 K/uL   Complete Metabolic Panel (CMP)   Result Value Ref Range    Sodium 141 135 - 145 mmol/L    Potassium 4.0 3.6 - 5.5 mmol/L    Chloride 106 96 - 112 mmol/L    Co2 23 20 - 33 mmol/L    Anion Gap 12.0 7.0 - 16.0    Glucose 96 65 - 99 mg/dL    Bun 14 8 - 22 mg/dL    Creatinine 0.78 0.50 - 1.40 mg/dL    Calcium 9.3 8.5 - 10.5 mg/dL    Correct Calcium 9.4 8.5 - 10.5 mg/dL    AST(SGOT) 26 12 - 45 U/L    ALT(SGPT) 32 2 - 50 U/L    Alkaline Phosphatase 96 30 - 99 U/L    Total Bilirubin 0.3 0.1 - 1.5  mg/dL    Albumin 3.9 3.2 - 4.9 g/dL    Total Protein 7.7 6.0 - 8.2 g/dL    Globulin 3.8 (H) 1.9 - 3.5 g/dL    A-G Ratio 1.0 g/dL   Troponins NOW   Result Value Ref Range    Troponin T 82 (H) 6 - 19 ng/L   Urinalysis    Specimen: Urine   Result Value Ref Range    Color Yellow     Character Clear     Specific Gravity 1.017 <1.035    Ph 7.5 5.0 - 8.0    Glucose Negative Negative mg/dL    Ketones Negative Negative mg/dL    Protein Negative Negative mg/dL    Bilirubin Negative Negative    Urobilinogen, Urine 0.2 Negative    Nitrite Negative Negative    Leukocyte Esterase Negative Negative    Occult Blood Negative Negative    Micro Urine Req see below    aPTT   Result Value Ref Range    APTT 25.1 24.7 - 36.0 sec   Prothrombin Time   Result Value Ref Range    PT 13.1 12.0 - 14.6 sec    INR 0.98 0.87 - 1.13   Heparin Xa (Unfractionated)   Result Value Ref Range    Heparin Xa (UFH) <0.10 IU/mL   ESTIMATED GFR   Result Value Ref Range    GFR (CKD-EPI) 109 >60 mL/min/1.73 m 2   proBrain Natriuretic Peptide, NT   Result Value Ref Range    NT-proBNP <36 0 - 125 pg/mL   EKG   Result Value Ref Range    Report       [facility] Emergency Dept.    Test Date:  2024  Pt Name:    MAME HWANG     Department: ER  MRN:        [id number]                      Room:       [facility]  Gender:     Male                         Technician: [id number]  :        1975                   Requested By:ER TRIAGE PROTOCOL  Order #:    [id number]                    Nitin MD: KIRILL COATES    Measurements  Intervals                                Axis  Rate:       70                           P:          19  PA:         177                          QRS:        32  QRSD:       94                           T:          35  QT:         386  QTc:        417    Interpretive Statements  Sinus rhythm  RSR' in V1 or V2, probably normal variant  Abnormal inferior Q waves  ST elev, probable normal early repol pattern  No previous  ECG available for comparison  Impression: J-point elevations in V2 V3 no reciprocal changes.  Likely  repolarization abnormality.  Electronically Signed On 08- 22:2 9:52 PDT by KIRILL COATES        All labs reviewed by me.    EKG:   I have independently interpreted this EKG as seen above.    Radiology:   The attending Emergency Physician has independently interpreted the diagnostic imaging associated with this visit and is awaiting the final reading from the radiologist, which will be displayed below.    Radiologist interpretation:   DX-CHEST-PORTABLE (1 VIEW)   Final Result      1.  No acute cardiac or pulmonary abnormalities are identified.   2.  Dilated loop of small bowel in the left upper quadrant. This could reflect ileus or small bowel obstruction.      EC-ECHOCARDIOGRAM COMPLETE W/ CONT    (Results Pending)   CL-LEFT HEART CATHETERIZATION WITH POSSIBLE INTERVENTION    (Results Pending)        COURSE & MEDICAL DECISION MAKING    ED Observation Status? No; Patient does not meet criteria for ED Observation.     8:32 PM - Patient seen and evaluated at bedside. Ordered EKG, DX-Chest, Troponins, CMP, and CBC w/ diff to evaluate. He understands and agrees to the plan of care.     8:50 PM- I discussed the patient's case and the above findings with Dr. Wilburn (Cardiology) who agrees to evaluate the patient for surgery.    8:59 PM - I discussed the patient's case and the above findings with Dr. Cerrato (Hospitalist) who agrees to evaluate the patient for hospitalization.      INITIAL ASSESSMENT AND PLAN  Care Narrative: This is a 48-year-old gentleman who presents with 3 episodes of chest pain.  He currently has no chest pain but he does have some J-point elevations on his EKG and had a rising troponin at the outside facility and therefore he was directed through the emergency department for evaluation rather than direct admit.  He is currently chest pain-free.  He has no further EKG changes.  Cardiology was  involved prior to transfer and they have been updated.  They will come evaluate the patient.  He will be started on heparin.  His blood pressure is significantly better than at the outside facility after being on Nitropaste.  He will be hospitalized at this point on heparin for further cardiac workup.      CHEST PAIN:   HEART Score for Major Cardiac Events  HEART Score     History: Highly suspicious  ECG: Non-specific repolarization disturbance  Age: 45-64  Risk Factors: 1-2 risk factors  Troponin: Greater than or equal to 3 times normal limit    Heart Score: 7    Total Score   0-3 Points = Low Score, risk of MACE 0.9-1.7%.  4-6 Points = Moderate Score, risk of MACE 12-16.6%  7-10 Points = High Score, risk of MACE 50-65%                   DISPOSITION AND DISCUSSIONS  I have discussed management of the patient with the following physicians and FARRUKH's: Dr. Wilburn (Cardiology), Dr. Cerrato (Hospitalist)    Discussion of management with other [facility] or appropriate source(s): None       DISPOSITION:  Patient will be hospitalized by Dr. Cerrato in guarded condition.     FINAL IMPRESSION   1. Acute coronary syndrome (HCC)      Nissa DIEZ (Scribeulalia), am scribing for, and in the presence of, Rosalie Downs M.D..    Electronically signed by: Nissa Leavitt (Michael), 8/31/2024    Rosalie DIEZ M.D. personally performed the services described in this documentation, as scribed by Nissa Leavitt in my presence, and it is both accurate and complete.    The note accurately reflects work and decisions made by me.  Rosalie Downs M.D.  8/31/2024  10:31 PM

## 2024-09-01 NOTE — CONSULTS
Cardiac Catheterization and Percutaneous Intervention Procedure Report    9/1/2024    Referring MD: Dr. Wilburn    Indication for procedure: Non-ST elevation MI    Procedures:  Coronary arteriograms  Left heart catheterization, left ventriculogram  IVUS guided PCI of proximal circumflex artery into large marginal branch using 3.5 x 32 mm Synergy drug-eluting stent  IVUS guided PCI of mid LAD using 3.5 x 38 mm Synergy drug-eluting stent    Final diagnosis:   two vessel coronary artery disease.  Successful percutaneous intervention of left anterior descending artery, left circumflex coronary arteries.    Recommendations: Guideline directed medical therapy and risk factor management      Coronary arteriograms:  Left main: normal  Left anterior descending: Mild disease in the proximal portion, midportion has diffuse moderate disease with focal 90% stenosis just after dominant first diagonal takeoff.  Left circumflex: Continues as 1 large marginal branch, diffuse disease in the proximal portion into marginal branch with a focal 99% stenosis.  Right coronary: Luminal irregularities, 50% stenosis in distal portion, dominant    Left Heart Catheterization:  Left Ventriculogram: ejection fraction 60%  Left Ventricular EDP: 20 mm Hg   Aortic Valve Gradient: No significant AV gradient noted    Procedure details:  Patrick Sagastume was brought to the cardiac catheterization lab where the right wrist was prepped and draped in the usual manner for cardiac catheterization.  Timeout was performed.  The area was anesthetized with lidocaine and a 5/6 FR sheath was inserted into the right radial artery without difficulty. A #3.5 left Candy catheter was advanced to the ostia of the Left coronary artery and arteriograms were recorded.   A #4 right Candy catheter was advanced to the ostia of the right coronary artery and arteriograms were recorded. Aortic valve was crossed using #4 right Candy catheter left heart  catheterization and left ventriculogram were performed.  Patient underwent percutaneous coronary revascularization as outlined below.  At the completion of the case the sheath was removed and hemostasis achieved utilizing a radial compression band .  Patient was pain-free and hemodynamically stable at the completion of the case.  There were no apparent complications.    Interventional Procedure:     EBU 3.5 guide catheter was used to engage left main.  Run-through wire was used to cross the lesion in left circumflex artery.  Left circumflex artery has 99% stenosis, long segment diffuse disease, IVUS was performed showed long segment disease lesion length was 30 mm, average vessel diameter was 3.5 mm.  A 3.5 x 32 mm Synergy drug-eluting stent was placed extending from proximal circumflex artery into marginal branch.  This was postdilated using 3.5 x 20 mm NC balloon with good result.  Post 0% stenosis, DAVID-3 flow.  Then run-through wire was advanced to left anterior descending artery, left anterior descending artery has long segment stenosis, focal 95% stenosis in midportion.  This was predilated using 2.5 x 30 mm compliant balloon at 10 saba pressures.  A 3.5 x 38 mm Synergy drug-eluting stent was placed in mid left anterior descending artery, this was postdilated using 3.5 x 20 mm NC balloon.  IVUS was performed showed good size stent, adequate apposition but slightly underexpanded in midportion, this was dilated using 3.25 x 12 mm NC balloon at 20 saba pressures.  There was some recoil after the expansion but good apposition.  First diagonal branch was jailed, had 90% ostial stenosis, this was recrossed, dilated using 2.0, 2.25 x 12 mm compliant balloons, with good result, restoration of DAVID-3 flow, ostial diagonal branch has small nonflow limiting dissection but good DAVID-3 flow distally, additional stents are not placed.  Patient tolerated the procedure well.    Discussed with him and his wife at length  regarding medication compliance, smoking cessation, regular follow-up, diet, exercise.    Anticoagulant: Heparin  Antiplatelet: Prasugrel  EBL <25 cc  Complications: none  Specimens: none  Contrast: 100 cc  Fluorotime : see cath lab flowsheet      Sedation: I supervised moderate sedation over a trained independent observer.    Sedation start time: 10:31  End time: 11:20      Electronically signed by   Harsh Kiser M.D., MINH  Interventional cardiologist  9/1/2024  11:26 AM

## 2024-09-01 NOTE — DIETARY
"Nutrition Services: Initial Assessment     Day 1 of admit. Patrick Sagastume is a 48 y.o. male with admitting DX of ACS (acute coronary syndrome), Chest pain, rule out acute myocardial infarction      MD consult received for  unexplained wt loss ; wt loss (14-23 lbs in 3 months). No poor PO reported.     Nutrition Assessment:      Height: 160 cm (5' 2.99\")  Weight: 70.7 kg (155 lb 13.8 oz)  Weight taken via standing scale  Body mass index is 27.62 kg/m². BMI classification: Overweight.    Wt Readings from Last 6 Encounters:   09/01/24 70.7 kg (155 lb 13.8 oz)      Objective:  Admitted with chest pain.  Pertinent medical hx: History reviewed. No pertinent past medical history.  Pertinent labs: Glucose 108, A1c 5.8 (9/1)  Pertinent meds: Pericolace  Skin/wounds: none noted   Food Allergies: None noted  Last BM: 08/31/24 per flowsheets     Current diet order:   Cardiac diet; PO % for one meal thus far    Subjective:   Visited pt at bedside. Pt was sleeping, did not rouse to name. He appeared adequately nourished.    Nutrition Focused Physical Exam (NFPE)   Weight loss: unknown  Muscle mass: Well-nourished  Subcutaneous fat: Well-nourished  Fluid Accumulation: none noted  Reduced  Strength: N/A in acute care setting.     Nutrition Diagnosis:      Based on RD assessment at this time, pt does not meet criteria in congruence with ASPEN/Academy guidelines for malnutrition.        Nutrition Interventions:      Encourage intake of meals.  Recommend oral nutrition supplements to optimize intake as needed.  Patient aware of active plan of care as appropriate.     Nutrition Monitoring and Evaluation:     Monitor nutrition POC  Additional fluids per MD/DO  Monitor vital signs pertinent to nutrition       RD following and will provide updated recommendations as indicated.     Wandy Granados R.D.    "

## 2024-09-01 NOTE — PROGRESS NOTES
[facility] Medicine Daily Progress Note    Date of Service  9/1/2024    Chief Complaint  Patrick Sagastume is a 48 y.o. male admitted 8/31/2024 with chest pain    Hospital Course   48 y.o. male with history of tobacco abuse who presented 8/31/2024 with evaluation for chest pain.  Patient initially presented to [place] emergency room for chest pain and uncontrolled hypertension.     Patient is visiting from [facility] to [facility] for the [place] weekend.  [place] to have substernal chest pain radiating to right upper extremity and jaw.  Patient went to ER for evaluation.  In [place] ER, noted to -200, as well as elevated troponin.  No definitive ST elevation seen.  Patient was initially planned as direct admit to [facility].  However, condition declining, persistent chest pain, sent to [facility] ER.     Repeat troponin at [facility] ER is 82.  Due to concern of symptoms or acute coronary syndrome, cardiology was consulted.  Patient was evaluated by cardiology, recommended optimizing CAD meds, heparin drip, tentative plan for heart catheterization.      Interval Problem Update  9/1 mildly hypertensive on room air  CBC stable  A1c 5.8,   Troponins flat 82, 87, 81  On heparin drip continue to monitor hemoglobin and Xa levels echocardiogram pending  Discussed with cardiology, patient taken for cardiac cath this morning and 2 stents were placed in the mid LAD and Lcx.   Started aspirin, statin, metoprolol  Examined patient post procedure, he denies any further chest pain.  Did have some nausea and vomiting this morning that has since resolved.  Will monitor overnight on telemetry    I have discussed this patient's plan of care and discharge plan at IDT rounds today with Case Management, Nursing, Nursing leadership, and other members of the IDT team.    Consultants/Specialty  cardiology    Code Status  Full Code    Disposition  The patient is not  medically cleared for discharge to home or a post-acute facility.  Anticipate discharge to: home with close outpatient follow-up    I have placed the appropriate orders for post-discharge needs.    Review of Systems  Review of Systems   Constitutional:  Positive for malaise/fatigue. Negative for chills and fever.   Respiratory:  Negative for shortness of breath.    Cardiovascular:  Negative for chest pain.   Gastrointestinal:  Positive for nausea and vomiting. Negative for abdominal pain.   Musculoskeletal:  Negative for myalgias.   Skin:  Negative for rash.   Neurological:  Negative for dizziness and headaches.   Psychiatric/Behavioral:  Negative for depression.    All other systems reviewed and are negative.       Physical Exam  Temp:  [36.1 °C (97 °F)-37.1 °C (98.8 °F)] 36.7 °C (98.1 °F)  Pulse:  [72-77] 77  Resp:  [14-18] 18  BP: (133-157)/() 157/110  SpO2:  [92 %-94 %] 92 %    Physical Exam  Vitals and nursing note reviewed.   Constitutional:       General: He is not in acute distress.     Appearance: Normal appearance. He is not ill-appearing.   HENT:      Head: Normocephalic and atraumatic.   Eyes:      Conjunctiva/sclera: Conjunctivae normal.   Cardiovascular:      Rate and Rhythm: Normal rate and regular rhythm.      Pulses: Normal pulses.   Pulmonary:      Effort: Pulmonary effort is normal. No respiratory distress.      Breath sounds: Normal breath sounds. No wheezing.   Abdominal:      General: Abdomen is flat. There is no distension.      Palpations: Abdomen is soft.      Tenderness: There is no abdominal tenderness.   Musculoskeletal:         General: No swelling. Normal range of motion.      Cervical back: Normal range of motion and neck supple.   Skin:     General: Skin is warm and dry.      Findings: No rash.   Neurological:      General: No focal deficit present.      Mental Status: He is alert and oriented to person, place, and time.      Cranial Nerves: No cranial nerve deficit.    Psychiatric:         Mood and Affect: Mood normal.         Behavior: Behavior normal.         Fluids    Intake/Output Summary (Last 24 hours) at 9/1/2024 0805  Last data filed at 8/31/2024 2300  Gross per 24 hour   Intake 400 ml   Output 350 ml   Net 50 ml        Laboratory  Recent Labs     08/31/24 2041 09/01/24  0100   WBC 10.1 9.8   RBC 4.96 4.85   HEMOGLOBIN 15.3 14.6   HEMATOCRIT 44.5 43.6   MCV 89.7 89.9   MCH 30.8 30.1   MCHC 34.4 33.5   RDW 43.1 43.5   PLATELETCT 279 261   MPV 9.0 9.2     Recent Labs     08/31/24 2041 09/01/24  0100   SODIUM 141 139   POTASSIUM 4.0 3.7   CHLORIDE 106 106   CO2 23 22   GLUCOSE 96 108*   BUN 14 14   CREATININE 0.78 0.78   CALCIUM 9.3 9.1     Recent Labs     08/31/24 2041   APTT 25.1   INR 0.98         Recent Labs     09/01/24  0100   TRIGLYCERIDE 159*   HDL 46   *       Imaging  DX-CHEST-PORTABLE (1 VIEW)   Final Result      1.  No acute cardiac or pulmonary abnormalities are identified.   2.  Dilated loop of small bowel in the left upper quadrant. This could reflect ileus or small bowel obstruction.      EC-ECHOCARDIOGRAM COMPLETE W/ CONT    (Results Pending)   CL-LEFT HEART CATHETERIZATION WITH POSSIBLE INTERVENTION    (Results Pending)        Assessment/Plan  * ACS (acute coronary syndrome) (HCC)- (present on admission)  Assessment & Plan  Chest pain, NSTEMI  Aspirin, statin, metoprolol  Nitropaste  As needed nitro SL, morphine  Heparin drip  Check echo    Cardiology consulted, tentative plan for heart catheterization    Hypertension  Assessment & Plan  Reported to have  - 200 at [place]  Started on metoprolol 25 mg twice daily  May at additional antihypertensive pending clinical course  As needed IV labetalol, IV hydralazine    Tobacco abuse counseling  Assessment & Plan  Reported smoking 1 pack of cigarettes daily  Tobacco smoking cessation  provided: 11 minutes  We discussed tobacco smoking cessation given concern for acute coronary  syndrome, NSTEMI, high probability of going to cardiogenic shock and cardiac arrest and eventual death.  Given acute heart attack, discussed the need for absolute tobacco smoking cessation.  He expressed understanding.  Provided patient with standard tobacco cessation information per protocol.  Offered nicotine patch, nicotine gum, Chantix as alternative.    NSTEMI (non-ST elevated myocardial infarction) (HCC)  Assessment & Plan  As noted in acute coronary syndrome       Patient is critically ill.   The patient has: NSTEMI  The vital organ system that is affected is the: Heart  If untreated there is a high chance of deterioration into and eventually death.  The critical care that I am providing today is: Close monitoring of vital signs.  Patient on heparin drip for NSTEMI plan for cardiac cath today.  Close monitoring of hemoglobin and Xa levels.  Close monitoring on telemetry for arrhythmia.  The critical care that has been undertaken is medically complex.  There has been no overlap in critical care time.  Critical Care Time not including procedures: 33 mins    VTE prophylaxis:    therapeutic anticoagulation with weight-based heparin gtt, pharmacy to adjust PRN      I have performed a physical exam and reviewed and updated ROS and Plan today (9/1/2024). In review of yesterday's note (8/31/2024), there are no changes except as documented above.

## 2024-09-01 NOTE — CONSULTS
Interventional cardiology consultation requested by Dr. Wilburn for cardiac catheterization and possible percutaneous coronary intervention    CC:   Chief Complaint   Patient presents with    Chest Pain     Pt states he has had 3 episodes of sudden onset mid sternal CP that radiates to his R shoulder and arm since yesterday -sob       HPI:  48 y.o. year old patient with no significant prior cardiac history presented to outside emergency room with substernal chest pain radiating to his arms, moderate intensity, associated with shortness of breath.  Patient is visiting [place] from the [place] area.  His symptoms lasted for about 10 minutes.  His troponin came back positive so subsequently he was transferred here for non-ST elevation MI management.  I was consulted to evaluate this patient for coronary angiogram, possible PCI.  He feels well this morning    Medications / Drug list prior to admission:  No current facility-administered medications on file prior to encounter.     No current outpatient medications on file prior to encounter.       Current list of administered Medications:    Current Facility-Administered Medications:     VERAPAMIL HCL 2.5 MG/ML IV SOLN, , , ,     HEPARIN SODIUM (PORCINE) 1000 UNIT/ML INJ SOLN, , , ,     LIDOCAINE HCL 2 % INJ SOLN, , , ,     HEPARIN (PORCINE) IN NACL 2000-0.9 UNIT/L-% IV SOLN, , , ,     NITROGLYCERIN 2 MG IV SOLN, , , ,     aspirin EC tablet 81 mg, 81 mg, Oral, DAILY, Cale Wilburn M.D., 81 mg at 09/01/24 0517    atorvastatin (Lipitor) tablet 40 mg, 40 mg, Oral, QHS, Cale Wilburn M.D., 40 mg at 08/31/24 2159    nitroglycerin (Nitro-Bid) 2 % ointment 0.5 Inch, 0.5 Inch, Topical, Q6HRS, Cale Wilburn M.D., 0.5 Inch at 09/01/24 0517    metoprolol tartrate (Lopressor) tablet 25 mg, 25 mg, Oral, TWICE DAILY, Cale Wilburn M.D., 25 mg at 09/01/24 0517    LR (Bolus) infusion 500 mL, 500 mL, Intravenous, Once PRN, Catalino Cerrato M.D.    acetaminophen  (Tylenol) tablet 650 mg, 650 mg, Oral, Q6HRS PRN, Ctaalino Cerrato M.D.    labetalol (Normodyne/Trandate) injection 10 mg, 10 mg, Intravenous, Q4HRS PRN, Catalino Cerrato M.D.    heparin infusion 25,000 units in 500 mL 0.45% NACL, 0-30 Units/kg/hr (Adjusted), Intravenous, Continuous, Catalino Cerrato M.D., Last Rate: 18.5 mL/hr at 09/01/24 0706, 14 Units/kg/hr at 09/01/24 0706    heparin injection 2,000 Units, 30 Units/kg (Adjusted), Intravenous, PRN, Catalino Cerrato M.D., 2,000 Units at 09/01/24 0512    nitroglycerin (Nitrostat) tablet 0.4 mg, 0.4 mg, Sublingual, Q5 MIN PRN, Catalino Cerrato M.D.    morphine 4 MG/ML injection 2-4 mg, 2-4 mg, Intravenous, Q5 MIN PRN, Catalino Cerrato M.D.    ondansetron (Zofran) syringe/vial injection 4 mg, 4 mg, Intravenous, Q4HRS PRN, Catalino Cerrato M.D.    ondansetron (Zofran ODT) dispertab 4 mg, 4 mg, Oral, Q4HRS PRN, Catalino Cerrato M.D.    promethazine (Phenergan) tablet 12.5-25 mg, 12.5-25 mg, Oral, Q4HRS PRN, Catalino Cerrato M.D.    promethazine (Phenergan) suppository 12.5-25 mg, 12.5-25 mg, Rectal, Q4HRS PRN, Catalino Cerrato M.D.    prochlorperazine (Compazine) injection 5-10 mg, 5-10 mg, Intravenous, Q4HRS PRN, Catalino Cerrato M.D.    senna-docusate (Pericolace Or Senokot S) 8.6-50 MG per tablet 2 Tablet, 2 Tablet, Oral, Q EVENING **AND** polyethylene glycol/lytes (Miralax) Packet 1 Packet, 1 Packet, Oral, QDAY PRN, Catalino Cerrato M.D.    nicotine (Nicoderm) 21 MG/24HR 21 mg, 21 mg, Transdermal, Daily-0600 **AND** Nicotine Replacement Patient Education Materials, , , Once **AND** nicotine polacrilex (Nicorette) 2 MG piece 2 mg, 2 mg, Oral, Q HOUR PRN, Catalino Cerrato M.D.    NS infusion, , Intravenous, Continuous, Catalino Cerrato M.D., Last Rate: 75 mL/hr at 08/31/24 2220, New Bag at 08/31/24 2220    hydrALAZINE (Apresoline) injection 10 mg, 10 mg, Intravenous, Q4HRS PRN, Catalino Cerrato M.D.    History reviewed. No pertinent past medical history.    History reviewed. No pertinent surgical  "history.    History reviewed. No pertinent family history.  Patient family history was personally reviewed, no pertinent family history to current presentation    Social History     Tobacco Use    Smoking status: Never    Smokeless tobacco: Never       ALLERGIES:  No Known Allergies    Review of systems:  A complete review of symptoms was reviewed with patient. This is reviewed in H&P and PMH. ALL OTHERS reviewed and negative    Physical exam:  Patient Vitals for the past 24 hrs:   BP Temp Temp src Pulse Resp SpO2 Height Weight   24 0742 (!) 157/110 36.7 °C (98.1 °F) Temporal 77 18 92 % -- --   24 0448 (!) 147/101 37.1 °C (98.8 °F) Temporal 72 16 94 % -- 70.7 kg (155 lb 13.8 oz)   24 0057 (!) 133/92 36.9 °C (98.4 °F) Temporal 74 17 92 % -- --   24 2139 (!) 143/97 36.7 °C (98.1 °F) Temporal 77 18 93 % -- 70.7 kg (155 lb 13.8 oz)   24 2100 (!) 133/95 -- -- 77 14 94 % -- --   24 2030 (!) 137/95 36.1 °C (97 °F) Temporal 75 14 94 % -- --   24 -- -- -- -- -- -- 1.6 m (5' 2.99\") 79.4 kg (175 lb)     General: No acute distress.   EYES: no jaundice  HEENT: OP clear   Neck:  No JVD.   CVS: Regular  Resp: Normal respiratory effort, CTAB. No wheezing or crackles/rhonchi.  Abdomen: Soft, ND,  Skin: Grossly nothing acute no obvious rashes  Neurological: Alert, Moves all extremities  Extremities:   [  no ] edema. No cyanosis.       Data:  Laboratory studies personally reviewed by me:  Recent Results (from the past 24 hour(s))   EKG    Collection Time: 24  8:22 PM   Result Value Ref Range    Report       [facility] Emergency Dept.    Test Date:  2024  Pt Name:    MAME HWANG     Department: ER  MRN:        [id number]                      Room:       [facility]  Gender:     Male                         Technician: [id number]  :        [id number]                   Requested By:ER TRIAGE PROTOCOL  Order #:    [id number]                    Reading MD: KIRILL" JAXON    Measurements  Intervals                                Axis  Rate:       70                           P:          19  MD:         177                          QRS:        32  QRSD:       94                           T:          35  QT:         386  QTc:        417    Interpretive Statements  Sinus rhythm  RSR' in V1 or V2, probably normal variant  Abnormal inferior Q waves  ST elev, probable normal early repol pattern  No previous ECG available for comparison  Impression: J-point elevations in V2 V3 no reciprocal changes.  Likely  repolarization abnormality.  Electronically Signed On 08- 22:2 9:52 PDT by KIRILL COATES     CBC with Differential    Collection Time: 08/31/24  8:41 PM   Result Value Ref Range    WBC 10.1 4.8 - 10.8 K/uL    RBC 4.96 4.70 - 6.10 M/uL    Hemoglobin 15.3 14.0 - 18.0 g/dL    Hematocrit 44.5 42.0 - 52.0 %    MCV 89.7 81.4 - 97.8 fL    MCH 30.8 27.0 - 33.0 pg    MCHC 34.4 32.3 - 36.5 g/dL    RDW 43.1 35.9 - 50.0 fL    Platelet Count 279 164 - 446 K/uL    MPV 9.0 9.0 - 12.9 fL    Neutrophils-Polys 68.40 44.00 - 72.00 %    Lymphocytes 17.90 (L) 22.00 - 41.00 %    Monocytes 8.00 0.00 - 13.40 %    Eosinophils 4.70 0.00 - 6.90 %    Basophils 0.60 0.00 - 1.80 %    Immature Granulocytes 0.40 0.00 - 0.90 %    Nucleated RBC 0.00 0.00 - 0.20 /100 WBC    Neutrophils (Absolute) 6.90 1.82 - 7.42 K/uL    Lymphs (Absolute) 1.81 1.00 - 4.80 K/uL    Monos (Absolute) 0.81 0.00 - 0.85 K/uL    Eos (Absolute) 0.47 0.00 - 0.51 K/uL    Baso (Absolute) 0.06 0.00 - 0.12 K/uL    Immature Granulocytes (abs) 0.04 0.00 - 0.11 K/uL    NRBC (Absolute) 0.00 K/uL   Complete Metabolic Panel (CMP)    Collection Time: 08/31/24  8:41 PM   Result Value Ref Range    Sodium 141 135 - 145 mmol/L    Potassium 4.0 3.6 - 5.5 mmol/L    Chloride 106 96 - 112 mmol/L    Co2 23 20 - 33 mmol/L    Anion Gap 12.0 7.0 - 16.0    Glucose 96 65 - 99 mg/dL    Bun 14 8 - 22 mg/dL    Creatinine 0.78 0.50 - 1.40 mg/dL    Calcium 9.3  8.5 - 10.5 mg/dL    Correct Calcium 9.4 8.5 - 10.5 mg/dL    AST(SGOT) 26 12 - 45 U/L    ALT(SGPT) 32 2 - 50 U/L    Alkaline Phosphatase 96 30 - 99 U/L    Total Bilirubin 0.3 0.1 - 1.5 mg/dL    Albumin 3.9 3.2 - 4.9 g/dL    Total Protein 7.7 6.0 - 8.2 g/dL    Globulin 3.8 (H) 1.9 - 3.5 g/dL    A-G Ratio 1.0 g/dL   Troponins NOW    Collection Time: 08/31/24  8:41 PM   Result Value Ref Range    Troponin T 82 (H) 6 - 19 ng/L   aPTT    Collection Time: 08/31/24  8:41 PM   Result Value Ref Range    APTT 25.1 24.7 - 36.0 sec   Prothrombin Time    Collection Time: 08/31/24  8:41 PM   Result Value Ref Range    PT 13.1 12.0 - 14.6 sec    INR 0.98 0.87 - 1.13   Heparin Xa (Unfractionated)    Collection Time: 08/31/24  8:41 PM   Result Value Ref Range    Heparin Xa (UFH) <0.10 IU/mL   ESTIMATED GFR    Collection Time: 08/31/24  8:41 PM   Result Value Ref Range    GFR (CKD-EPI) 109 >60 mL/min/1.73 m 2   proBrain Natriuretic Peptide, NT    Collection Time: 08/31/24  8:41 PM   Result Value Ref Range    NT-proBNP <36 0 - 125 pg/mL   URINE DRUG SCREEN    Collection Time: 08/31/24 10:05 PM   Result Value Ref Range    Amphetamines Urine Negative Negative    Barbiturates Negative Negative    Benzodiazepines Positive (A) Negative    Cocaine Metabolite Negative Negative    Fentanyl, Urine Negative Negative    Methadone Negative Negative    Opiates Negative Negative    Oxycodone Negative Negative    Phencyclidine -Pcp Negative Negative    Propoxyphene Negative Negative    Cannabinoid Metab Negative Negative   Urinalysis    Collection Time: 08/31/24 10:05 PM    Specimen: Urine   Result Value Ref Range    Color Yellow     Character Clear     Specific Gravity 1.017 <1.035    Ph 7.5 5.0 - 8.0    Glucose Negative Negative mg/dL    Ketones Negative Negative mg/dL    Protein Negative Negative mg/dL    Bilirubin Negative Negative    Urobilinogen, Urine 0.2 Negative    Nitrite Negative Negative    Leukocyte Esterase Negative Negative    Occult  Blood Negative Negative    Micro Urine Req see below    Troponins in two (2) hours    Collection Time: 08/31/24 10:34 PM   Result Value Ref Range    Troponin T 87 (H) 6 - 19 ng/L   CBC WITH DIFFERENTIAL    Collection Time: 09/01/24  1:00 AM   Result Value Ref Range    WBC 9.8 4.8 - 10.8 K/uL    RBC 4.85 4.70 - 6.10 M/uL    Hemoglobin 14.6 14.0 - 18.0 g/dL    Hematocrit 43.6 42.0 - 52.0 %    MCV 89.9 81.4 - 97.8 fL    MCH 30.1 27.0 - 33.0 pg    MCHC 33.5 32.3 - 36.5 g/dL    RDW 43.5 35.9 - 50.0 fL    Platelet Count 261 164 - 446 K/uL    MPV 9.2 9.0 - 12.9 fL    Neutrophils-Polys 60.40 44.00 - 72.00 %    Lymphocytes 22.10 22.00 - 41.00 %    Monocytes 8.90 0.00 - 13.40 %    Eosinophils 7.10 (H) 0.00 - 6.90 %    Basophils 0.80 0.00 - 1.80 %    Immature Granulocytes 0.70 0.00 - 0.90 %    Nucleated RBC 0.00 0.00 - 0.20 /100 WBC    Neutrophils (Absolute) 5.89 1.82 - 7.42 K/uL    Lymphs (Absolute) 2.15 1.00 - 4.80 K/uL    Monos (Absolute) 0.87 (H) 0.00 - 0.85 K/uL    Eos (Absolute) 0.69 (H) 0.00 - 0.51 K/uL    Baso (Absolute) 0.08 0.00 - 0.12 K/uL    Immature Granulocytes (abs) 0.07 0.00 - 0.11 K/uL    NRBC (Absolute) 0.00 K/uL   Comp Metabolic Panel    Collection Time: 09/01/24  1:00 AM   Result Value Ref Range    Sodium 139 135 - 145 mmol/L    Potassium 3.7 3.6 - 5.5 mmol/L    Chloride 106 96 - 112 mmol/L    Co2 22 20 - 33 mmol/L    Anion Gap 11.0 7.0 - 16.0    Glucose 108 (H) 65 - 99 mg/dL    Bun 14 8 - 22 mg/dL    Creatinine 0.78 0.50 - 1.40 mg/dL    Calcium 9.1 8.5 - 10.5 mg/dL    Correct Calcium 9.3 8.5 - 10.5 mg/dL    AST(SGOT) 27 12 - 45 U/L    ALT(SGPT) 28 2 - 50 U/L    Alkaline Phosphatase 92 30 - 99 U/L    Total Bilirubin 0.3 0.1 - 1.5 mg/dL    Albumin 3.7 3.2 - 4.9 g/dL    Total Protein 7.2 6.0 - 8.2 g/dL    Globulin 3.5 1.9 - 3.5 g/dL    A-G Ratio 1.1 g/dL   MAGNESIUM    Collection Time: 09/01/24  1:00 AM   Result Value Ref Range    Magnesium 2.1 1.5 - 2.5 mg/dL   PHOSPHORUS    Collection Time: 09/01/24  1:00  AM   Result Value Ref Range    Phosphorus 3.8 2.5 - 4.5 mg/dL   HEMOGLOBIN A1C    Collection Time: 09/01/24  1:00 AM   Result Value Ref Range    Glycohemoglobin 5.8 (H) 4.0 - 5.6 %    Est Avg Glucose 120 mg/dL   Lipid Profile (Tomorrow AM)    Collection Time: 09/01/24  1:00 AM   Result Value Ref Range    Cholesterol,Tot 215 (H) 100 - 199 mg/dL    Triglycerides 159 (H) 0 - 149 mg/dL    HDL 46 >=40 mg/dL     (H) <100 mg/dL   TROPONIN    Collection Time: 09/01/24  1:00 AM   Result Value Ref Range    Troponin T 81 (H) 6 - 19 ng/L   ESTIMATED GFR    Collection Time: 09/01/24  1:00 AM   Result Value Ref Range    GFR (CKD-EPI) 109 >60 mL/min/1.73 m 2   Heparin Xa (Unfractionated)    Collection Time: 09/01/24  4:02 AM   Result Value Ref Range    Heparin Xa (UFH) 0.23 IU/mL       Imaging:  DX-CHEST-PORTABLE (1 VIEW)   Final Result      1.  No acute cardiac or pulmonary abnormalities are identified.   2.  Dilated loop of small bowel in the left upper quadrant. This could reflect ileus or small bowel obstruction.      EC-ECHOCARDIOGRAM COMPLETE W/ CONT    (Results Pending)   CL-LEFT HEART CATHETERIZATION WITH POSSIBLE INTERVENTION    (Results Pending)         EKG tracings personally reviewed by me sinus rhythm    Echocardiogram not available    All pertinent features of laboratory and imaging reviewed including primary images where applicable      Principal Problem:    ACS (acute coronary syndrome) ([facility]) (POA: Yes)  Active Problems:    NSTEMI (non-ST elevated myocardial infarction) ([facility]) (POA: Unknown)    Tobacco abuse counseling (POA: Unknown)    Hypertension (POA: Unknown)  Resolved Problems:    * No resolved hospital problems. *      Assessment / Plan:  Today's encounter addressed an illness with threat to life/bodily function: Non-ST elevation MI.  All the pertinent data - EKGs, labs reviewed, Contrast allergy reviewed.  Airway assessment was performed, patient is suitable for moderate sedation.  Risk benefits of  moderate sedation discussed.  Treatment options including conservative management, coronary angiogram with possible PCI discussed.   Indications for PCI, procedure in detail, recovery, post-operative restrictions, follow up discussed.  Risk benefits of the PCI including but not limited to death, vascular injury, bleeding, cardiac tamponade, kidney injury discussed in detail.    Possible outcomes of the procedure - no intervention, PCI, need for bypass surgery discussed.  If PCI is needed, need for uninterrupted dual antiplatelet therapy, medication compliance discussed.  Patient verbalized understanding would like to proceed.          I personally discussed his case with  Dr Landy Johnson D.O.    No future appointments.    It is my pleasure to participate in the care of Mr. Curtis Sagastume.  Please do not hesitate to contact me with questions or concerns.    Harsh Kiser M.D.    9/1/2024

## 2024-09-01 NOTE — PROGRESS NOTES
4 Eyes Skin Assessment Completed by Niall, MERRITT and MERRITT Dwyer.    Head WDL  Ears WDL  Nose WDL  Mouth WDL  Neck WDL  Breast/Chest WDL  Shoulder Blades WDL  Spine WDL  (R) Arm/Elbow/Hand WDL  (L) Arm/Elbow/Hand WDL  Abdomen WDL  Groin WDL  Scrotum/Coccyx/Buttocks WDL  (R) Leg WDL  (L) Leg Bruising  (R) Heel/Foot/Toe WDL  (L) Heel/Foot/Toe WDL          Devices In Places Tele Box, Blood Pressure Cuff, and Pulse Ox      Interventions In Place Pillows    Possible Skin Injury No    Pictures Uploaded Into Epic N/A  Wound Consult Placed N/A  RN Wound Prevention Protocol Ordered No

## 2024-09-01 NOTE — H&P
Hospital Medicine History & Physical Note    Date of Service  8/31/2024    Primary Care Physician  No primary care provider on file.    Consultants  Cardiology (Dr. Tsai)    Code Status  Full Code    Chief Complaint  Chief Complaint   Patient presents with    Chest Pain     Pt states he has had 3 episodes of sudden onset mid sternal CP that radiates to his R shoulder and arm since yesterday -sob       History of Presenting Illness  Patrick Sagastume is a 48 y.o. male with history of tobacco abuse who presented 8/31/2024 with evaluation for chest pain.  Patient initially presented to [place] emergency room for chest pain and uncontrolled hypertension.    Patient is visiting from [facility] to [facility] [place] the [place] weekend.  [place] to have substernal chest pain radiating to right upper extremity and jaw.  Patient went to ER for evaluation.  In [place] ER, noted to -200, as well as elevated troponin.  No definitive ST elevation seen.  Patient was initially planned as direct admit to [facility].  However, condition declining, persistent chest pain, sent to [facility] ER.    Repeat troponin at [facility] ER is 82.  Due to concern of symptoms or acute coronary syndrome, cardiology was consulted.  Patient was evaluated by cardiology, recommended optimizing CAD meds, heparin drip, tentative plan for heart catheterization.  Admission requested by ERP.  Therefore, admitted to medicine service for further evaluation and treatment.    I discussed the plan of care with patient, family, bedside RN, charge RN, pharmacy, and cardiology.    Review of Systems  Review of Systems   Constitutional:  Positive for diaphoresis and malaise/fatigue. Negative for chills and fever.   HENT:  Negative for hearing loss and tinnitus.    Eyes:  Negative for blurred vision and double vision.   Respiratory:  Negative for cough and shortness of breath.    Cardiovascular:   Positive for chest pain and palpitations.   Gastrointestinal:  Negative for abdominal pain, heartburn, nausea and vomiting.   Genitourinary:  Negative for dysuria and urgency.   Musculoskeletal:  Negative for joint pain and myalgias.   Skin:  Negative for itching and rash.   Neurological:  Positive for weakness. Negative for dizziness and headaches.   Endo/Heme/Allergies:  Negative for environmental allergies. Does not bruise/bleed easily.   Psychiatric/Behavioral:  Negative for depression and substance abuse.    All other systems reviewed and are negative.      Past Medical History   has no past medical history on file.  Tobacco abuse    Surgical History   has no past surgical history on file.     Family History  family history is not on file.   Family history reviewed with patient. There is family history that is pertinent to the chief complaint.   FH of CAD    Social History   reports that he has never smoked. He has never used smokeless tobacco.  Admits to smoking at least 1 pack of cigarettes daily  Denies illicit drug use  Denies alcohol abuse    Allergies  No Known Allergies    Medications  None       Physical Exam  Temp:  [36.1 °C (97 °F)-36.7 °C (98.1 °F)] 36.7 °C (98.1 °F)  Pulse:  [75-77] 77  Resp:  [14-18] 18  BP: (133-143)/(95-97) 143/97  SpO2:  [93 %-94 %] 93 %  Blood Pressure: (!) 137/95   Temperature: 36.1 °C (97 °F)   Pulse: 75   Respiration: 14   Pulse Oximetry: 94 %       Physical Exam  Vitals and nursing note reviewed.   Constitutional:       General: He is not in acute distress.     Appearance: He is ill-appearing.   HENT:      Head: Normocephalic and atraumatic.      Nose: Nose normal.      Mouth/Throat:      Mouth: Mucous membranes are moist.      Pharynx: Oropharynx is clear.   Eyes:      General: No scleral icterus.     Extraocular Movements: Extraocular movements intact.   Cardiovascular:      Rate and Rhythm: Normal rate and regular rhythm.      Pulses: Normal pulses.      Heart sounds:       No friction rub.   Pulmonary:      Effort: No respiratory distress.      Breath sounds: Rales present. No wheezing.   Chest:      Chest wall: No tenderness.   Abdominal:      General: There is distension.      Tenderness: There is no abdominal tenderness. There is no guarding or rebound.   Musculoskeletal:         General: Normal range of motion.      Cervical back: Neck supple. No tenderness.      Right lower leg: No edema.      Left lower leg: No edema.   Skin:     General: Skin is warm and dry.      Capillary Refill: Capillary refill takes less than 2 seconds.   Neurological:      General: No focal deficit present.      Mental Status: He is alert and oriented to person, place, and time.   Psychiatric:         Mood and Affect: Mood normal.         Laboratory:  Recent Labs     08/31/24 2041   WBC 10.1   RBC 4.96   HEMOGLOBIN 15.3   HEMATOCRIT 44.5   MCV 89.7   MCH 30.8   MCHC 34.4   RDW 43.1   PLATELETCT 279   MPV 9.0     Recent Labs     08/31/24 2041   SODIUM 141   POTASSIUM 4.0   CHLORIDE 106   CO2 23   GLUCOSE 96   BUN 14   CREATININE 0.78   CALCIUM 9.3     Recent Labs     08/31/24 2041   ALTSGPT 32   ASTSGOT 26   ALKPHOSPHAT 96   TBILIRUBIN 0.3   GLUCOSE 96     Recent Labs     08/31/24 2041   APTT 25.1   INR 0.98     Recent Labs     08/31/24 2041   NTPROBNP <36         Recent Labs     08/31/24 2041 08/31/24  2234   TROPONINT 82* 87*       Imaging:  DX-CHEST-PORTABLE (1 VIEW)   Final Result      1.  No acute cardiac or pulmonary abnormalities are identified.   2.  Dilated loop of small bowel in the left upper quadrant. This could reflect ileus or small bowel obstruction.      EC-ECHOCARDIOGRAM COMPLETE W/ CONT    (Results Pending)   CL-LEFT HEART CATHETERIZATION WITH POSSIBLE INTERVENTION    (Results Pending)             X-Ray:  I have personally reviewed the images and compared with prior images.  EKG:  I have personally reviewed the images and compared with prior  images.    Assessment/Plan:  Justification for Admission Status  I anticipate this patient will require at least 2 midnights of hospitalization, therefore appropriate for inpatient status.      * ACS (acute coronary syndrome) ([facility])- (present on admission)  Assessment & Plan  Chest pain, NSTEMI  Aspirin, statin, metoprolol  Nitropaste  As needed nitro SL, morphine  Heparin drip  Check echo    Cardiology consulted, tentative plan for heart catheterization    NSTEMI (non-ST elevated myocardial infarction) ([facility])  Assessment & Plan  As noted in acute coronary syndrome    Hypertension  Assessment & Plan  Reported to have  - 200 at [place]  Started on metoprolol 25 mg twice daily  May at additional antihypertensive pending clinical course  As needed IV labetalol, IV hydralazine    Tobacco abuse counseling  Assessment & Plan  Reported smoking 1 pack of cigarettes daily  Tobacco smoking cessation  provided: 11 minutes  We discussed tobacco smoking cessation given concern for acute coronary syndrome, NSTEMI, high probability of going to cardiogenic shock and cardiac arrest and eventual death.  Given acute heart attack, discussed the need for absolute tobacco smoking cessation.  He expressed understanding.  Provided patient with standard tobacco cessation information per protocol.  Offered nicotine patch, nicotine gum, Chantix as alternative.        VTE prophylaxis: therapeutic anticoagulation with heparin drip    Critical care time: 50 minutes spent in chart review, medical management, coordinating care with patient/RN/ER staff/telemetry staff/pharmacy/consulting provider/frequent reevaluation of patient clinical response to treatment

## 2024-09-01 NOTE — CONSULTS
Cardiology Initial Consultation    Date of Service  8/31/2024    Referring Physician  Rosalie Downs M.D.    Reason for Consultation  NSTEMI    History of Presenting Illness  Patrick Sagastume is a 48 y.o. male from [place], [place] with a past medical history of chronic tobacco use who is transferred from [facility] ER 8/31/2024 for management of NSTEMI and hypertensive emergency.    The patient and his wife had come up from the [place] area to [facility], [place] for the Labor Day weekend.  Last evening before their trip the patient had developed diffuse substernal chest pain radiating to his right arm right shoulder and right upper scapula with some mild shortness of breath.  This reportedly lasted for 7 minutes and spontaneously resolved.  Sometime after they arrived in the [facility] the patient was walking when he had a more moderately severe recurrence of similar symptoms that lasted for about 10 minutes.  This prompted him to seek medical attention.    In the emergency room at [facility] blood pressure was severely elevated at 180-200 systolic.  EKG showed sinus rhythm with early anterior lateral repolarization changes.  Serial troponin levels were elevated.  The patient received metoprolol 50 mg p.o., IV metoprolol 50 mg.  Aspirin and Nitropaste.  He was transferred to [facility]    In [facility] ER BP is 137/97.  HR 75.  Currently having no chest pain.  EKG shows no new acute changes.  He has no prior history of heart disease.  Takes no prescription medications.  Smokes cigarettes.  No illicit drug use.  No history of hypertension, dyslipidemia or diabetes mellitus.  No family history of premature heart disease.    The patient works as a repair  for maintaining beverage machines.    Review of Systems  Review of Systems   Respiratory:  Negative for chest tightness and shortness of breath.    Cardiovascular:  Negative for palpitations.    Gastrointestinal:  Negative for abdominal pain and nausea.   Neurological:  Negative for dizziness and headaches.   All other systems reviewed and are negative.      Past Medical History   has no past medical history on file.    Surgical History   has no past surgical history on file.    Family History  No family history     Social History   reports that he has never smoked. He has never used smokeless tobacco.    Medications  None       Allergies  No Known Allergies    Vital signs in last 24 hours  Temp:  [36.1 °C (97 °F)] 36.1 °C (97 °F)  Pulse:  [75] 75  Resp:  [14] 14  BP: (137)/(95) 137/95  SpO2:  [94 %] 94 %    Physical Exam  Physical Exam  Constitutional:       General: He is not in acute distress.  HENT:      Head: Normocephalic.   Eyes:      General: No scleral icterus.     Conjunctiva/sclera: Conjunctivae normal.      Pupils: Pupils are equal, round, and reactive to light.   Neck:      Comments: Normal jugular venous pressure.  Cardiovascular:      Rate and Rhythm: Normal rate and regular rhythm.      Pulses:           Carotid pulses are 2+ on the right side and 2+ on the left side.       Radial pulses are 2+ on the right side and 2+ on the left side.        Dorsalis pedis pulses are 2+ on the right side and 2+ on the left side.        Posterior tibial pulses are 2+ on the right side and 2+ on the left side.      Heart sounds: S1 normal and S2 normal. No murmur heard.     No friction rub. No gallop.   Pulmonary:      Effort: Pulmonary effort is normal.      Breath sounds: Normal breath sounds. No wheezing, rhonchi or rales.   Abdominal:      General: Bowel sounds are normal.      Palpations: Abdomen is soft.      Tenderness: There is no abdominal tenderness.   Musculoskeletal:      Right lower leg: No edema.      Left lower leg: No edema.   Skin:     General: Skin is warm and dry.      Nails: There is no clubbing.   Neurological:      Mental Status: He is alert and oriented to person, place, and time.  "  Psychiatric:         Behavior: Behavior normal.         Lab Review  Lab Results   Component Value Date/Time    WBC 10.1 08/31/2024 08:41 PM    RBC 4.96 08/31/2024 08:41 PM    HEMOGLOBIN 15.3 08/31/2024 08:41 PM    HEMATOCRIT 44.5 08/31/2024 08:41 PM    MCV 89.7 08/31/2024 08:41 PM    MCH 30.8 08/31/2024 08:41 PM    MCHC 34.4 08/31/2024 08:41 PM    MPV 9.0 08/31/2024 08:41 PM      Lab Results   Component Value Date/Time    SODIUM 141 08/31/2024 08:41 PM    POTASSIUM 4.0 08/31/2024 08:41 PM    CHLORIDE 106 08/31/2024 08:41 PM    CO2 23 08/31/2024 08:41 PM    GLUCOSE 96 08/31/2024 08:41 PM    BUN 14 08/31/2024 08:41 PM    CREATININE 0.78 08/31/2024 08:41 PM      Lab Results   Component Value Date/Time    ASTSGOT 26 08/31/2024 08:41 PM    ALTSGPT 32 08/31/2024 08:41 PM     Lab Results   Component Value Date/Time    TROPONINT 82 (H) 08/31/2024 08:41 PM       No results for input(s): \"NTPROBNP\" in the last 72 hours.    Cardiac Imaging and Procedures Review  EKG:  My personal interpretation of the EKG dated 8/31/2024 is sinus rhythm early anterior repolarization.    Imaging  Chest X-Ray: 8/31/2024  No acute cardiopulmonary process  Personally reviewed images      Assessment  NSTEMI  Hypertensive emergency  Chronic tobacco use    Recommendation Discussion  Admit to telemetry  Aspirin 81 mg daily  Atorvastatin 40 mg daily  Metoprolol 25 mg daily  prn supplement nitroglycerin  IV heparin  Nitropaste 1/2 inch every 8 hours  I had a detailed discussion with the patient and his wife who is at the bedside via  as to the nature of his current cardiac condition and the fact that he had a heart attack.  I do circumstances I recommended proceeding with a coronary angiogram/cardiac catheterization, explaining the reason for the procedure, the procedure itself, the potential therapeutic scenario i.e. coronary stent placement and the attendant risks.  After further discussion and answering his questions the patient " expressed understanding and wished to proceed.  I informed the patient that if he has any recurrence of his chest or right arm discomfort that he should promptly notify the nurse.  N.p.o. after midnight  IV fluid normal saline  Discussed the need for complete smoking cessation and lifelong abstinence.  Cardiac catheterization in a.m. discussed management plan with Catalino Cerrato MD, hospitalist and Harsh Kiser MD interventional cardiologist    Thank you for allowing me to participate in the care of this patient.    Please contact me with any questions.    Cale Wilburn M.D.   Cardiologist, [facility]  [phone]

## 2024-09-01 NOTE — ED TRIAGE NOTES
"Chief Complaint   Patient presents with    Chest Pain     Pt states he has had 3 episodes of sudden onset mid sternal CP that radiates to his R shoulder and arm since yesterday -sob     Pt BIB EMS from [facility] for above complaint. Pt transferred for higher level of care with elevated trop and EKG changes.     Pt given nitro paste en route. Received 40meq potassium, ASA, and ativan at previous facility.     Pt initially hypertensive with 9/10 chest pain but normotensive with no new complaints of CP.     A+Ox4, GCS 15    BP (!) 137/95   Pulse 75   Temp 36.1 °C (97 °F) (Temporal)   Resp 14   Ht 1.6 m (5' 2.99\")   Wt 79.4 kg (175 lb)   SpO2 94%   BMI 31.01 kg/m²     "

## 2024-09-01 NOTE — CARE PLAN
The patient is Watcher - Medium risk of patient condition declining or worsening    Shift Goals  Clinical Goals: Monitor for chest pain, VS, NPO @ midnight  Patient Goals: Get better, sleep  Family Goals: safety    Progress made toward(s) clinical / shift goals:    Problem: Hemodynamics  Goal: Patient's hemodynamics, fluid balance and neurologic status will be stable or improve  Outcome: Progressing     Problem: Acute Care of the Cardiac Cath Patient  Goal: Pre Procedure Optimal Outcome for the Cardiac Cath Patient  Outcome: Progressing       Patient is not progressing towards the following goals:

## 2024-09-01 NOTE — PROCEDURES
Cardiac Catheterization and Percutaneous Intervention Procedure Report     9/1/2024     Referring MD: Dr. Wilburn     Indication for procedure: Non-ST elevation MI     Procedures:  Coronary arteriograms  Left heart catheterization, left ventriculogram  IVUS guided PCI of proximal circumflex artery into large marginal branch using 3.5 x 32 mm Synergy drug-eluting stent  IVUS guided PCI of mid LAD using 3.5 x 38 mm Synergy drug-eluting stent     Final diagnosis:   two vessel coronary artery disease.  Successful percutaneous intervention of left anterior descending artery, left circumflex coronary arteries.     Recommendations: Guideline directed medical therapy and risk factor management        Coronary arteriograms:  Left main: normal  Left anterior descending: Mild disease in the proximal portion, midportion has diffuse moderate disease with focal 90% stenosis just after dominant first diagonal takeoff.  Left circumflex: Continues as 1 large marginal branch, diffuse disease in the proximal portion into marginal branch with a focal 99% stenosis.  Right coronary: Luminal irregularities, 50% stenosis in distal portion, dominant     Left Heart Catheterization:  Left Ventriculogram: ejection fraction 60%  Left Ventricular EDP: 20 mm Hg   Aortic Valve Gradient: No significant AV gradient noted     Procedure details:  Patrick Sagastume was brought to the cardiac catheterization lab where the right wrist was prepped and draped in the usual manner for cardiac catheterization.  Timeout was performed.  The area was anesthetized with lidocaine and a 5/6 FR sheath was inserted into the right radial artery without difficulty. A #3.5 left Candy catheter was advanced to the ostia of the Left coronary artery and arteriograms were recorded.   A #4 right Candy catheter was advanced to the ostia of the right coronary artery and arteriograms were recorded. Aortic valve was crossed using #4 right Candy catheter left heart  This is a chronic health problem that is uncontrolled with current medications and lifestyle measures. Last vitamin D check in several 2018 was showing low at 12, not on any medications.   catheterization and left ventriculogram were performed.  Patient underwent percutaneous coronary revascularization as outlined below.  At the completion of the case the sheath was removed and hemostasis achieved utilizing a radial compression band .  Patient was pain-free and hemodynamically stable at the completion of the case.  There were no apparent complications.     Interventional Procedure:      EBU 3.5 guide catheter was used to engage left main.  Run-through wire was used to cross the lesion in left circumflex artery.  Left circumflex artery has 99% stenosis, long segment diffuse disease, IVUS was performed showed long segment disease lesion length was 30 mm, average vessel diameter was 3.5 mm.  A 3.5 x 32 mm Synergy drug-eluting stent was placed extending from proximal circumflex artery into marginal branch.  This was postdilated using 3.5 x 20 mm NC balloon with good result.  Post 0% stenosis, DAVID-3 flow.  Then run-through wire was advanced to left anterior descending artery, left anterior descending artery has long segment stenosis, focal 95% stenosis in midportion.  This was predilated using 2.5 x 30 mm compliant balloon at 10 saba pressures.  A 3.5 x 38 mm Synergy drug-eluting stent was placed in mid left anterior descending artery, this was postdilated using 3.5 x 20 mm NC balloon.  IVUS was performed showed good size stent, adequate apposition but slightly underexpanded in midportion, this was dilated using 3.25 x 12 mm NC balloon at 20 saba pressures.  There was some recoil after the expansion but good apposition.  First diagonal branch was jailed, had 90% ostial stenosis, this was recrossed, dilated using 2.0, 2.25 x 12 mm compliant balloons, with good result, restoration of DAVID-3 flow, ostial diagonal branch has small nonflow limiting dissection but good DAVID-3 flow distally, additional stents are not placed.  Patient tolerated the procedure well.     Discussed with him and his wife at length  regarding medication compliance, smoking cessation, regular follow-up, diet, exercise.     Anticoagulant: Heparin  Antiplatelet: Prasugrel  EBL <25 cc  Complications: none  Specimens: none  Contrast: 100 cc  Fluorotime : see cath lab flowsheet        Sedation: I supervised moderate sedation over a trained independent observer.    Sedation start time: 10:31  End time: 11:20        Electronically signed by   Harsh Kiser M.D., MIN[facility]  Interventional cardiologist  9/1/2024  11:26 AM

## 2024-09-01 NOTE — PROGRESS NOTES
Monitor Summary  Rhythm: SR 66-79    Ectopy: none    Intervals: .20/.07/.38  *strip not available at this time

## 2024-09-01 NOTE — ASSESSMENT & PLAN NOTE
Reported to have  - 200 at [facility]  Started on metoprolol 25 mg twice daily  May at additional antihypertensive pending clinical course  As needed IV labetalol, IV hydralazine

## 2024-09-01 NOTE — PROGRESS NOTES
Bedside report received from off going RN/tech: Niall, assumed care of patient.     Fall Risk Score: LOW RISK  Fall risk interventions in place: Place yellow fall risk ID band on patient, Provide patient/family education based on risk assessment, Educate patient/family to call staff for assistance when getting out of bed, Place fall precaution signage outside patient door, Utilize bed/chair fall alarm, and Bed alarm connected correctly  Bed type: Regular (Erasmo Score less than 17 interventions in place)  Patient on cardiac monitor: Yes  IVF/IV medications: Infusion per MAR (List Med(s)) hep gtt  Oxygen: Room Air  Bedside sitter: Not Applicable   Isolation: Not applicable

## 2024-09-01 NOTE — CARE PLAN
"  Problem: Knowledge Deficit - Standard  Goal: Patient and family/care givers will demonstrate understanding of plan of care, disease process/condition, diagnostic tests and medications  Outcome: Progressing     Problem: Hemodynamics  Goal: Patient's hemodynamics, fluid balance and neurologic status will be stable or improve  Outcome: Progressing     Problem: Fluid Volume  Goal: Fluid volume balance will be maintained  Outcome: Progressing     Problem: Acute Care of the Cardiac Cath Patient  Goal: Pre Procedure Optimal Outcome for the Cardiac Cath Patient  Outcome: Progressing  Goal: Post Procedure Optimal Outcome for the Cardiac Cath Patient  Outcome: Progressing   The patient is Watcher - Medium risk of patient condition declining or worsening    Shift Goals  Clinical Goals: Monitor vs; labs and bleeding from hep gtt ; prep for procedure  Patient Goals: get procedure done  Family Goals: at bedside    Progress made toward(s) clinical / shift goals:   Status post Heart cath with stents placed x2 , heparin gtt discontinued and site remains free from complication at this time, patient verbalized plan of care, diet advanced and mcall light within reach, bed alarm kept on,BP (!) 163/117   Pulse 87   Temp 37 °C (98.6 °F) (Temporal)   Resp 18   Ht 1.6 m (5' 2.99\")   Wt 70.7 kg (155 lb 13.8 oz)   SpO2 93%         "

## 2024-09-01 NOTE — PROGRESS NOTES
Cardiology Follow-up Note    Name:   Patrick Garcia     YOB: 1975  Age:   48 y.o.  male   MRN:   [id number]        Attending Provider: Dr Landy Johnson D.O.     Chief Complaint: Chest Pain (Pt states he has had 3 episodes of sudden onset mid sternal CP that radiates to his R shoulder and arm since yesterday -sob)       Reason for cardiology consult: NSTEMI    History of Present Illness  Patrick Sagastume is 48 y.o. male with prior medical history significant for chronic tobacco use who was admitted as a transfer from [facility] on 8/31/2024 with NSTEMI and hypertensive emergency with -200.     Patient is visiting here from [place], [place].     Interim Events 09/02/24   :  Underwent left heart cath yesterday with placement of 2 stents - circumflex and LAD  - Personal Telemetry interpretation: Sinus rhythm  - Overnight events: No Cardiac events   Patient denies  chest pain, shortness of breath, edema, dizziness/lightheadedness, or palpitations.   - Vitals: Blood pressure stabilized, 123/81  - Labs reviewed: Serum creatinine 0.87  - Weight: 152 pounds      Review of Systems   Constitutional:  Negative for chills, fever and malaise/fatigue.   Respiratory:  Negative for cough, sputum production, shortness of breath and wheezing.    Cardiovascular:  Negative for chest pain, palpitations, orthopnea, claudication, leg swelling and PND.   Gastrointestinal:  Negative for abdominal pain, blood in stool, nausea and vomiting.   Musculoskeletal: Negative.    Neurological:  Negative for dizziness, weakness and headaches.   Endo/Heme/Allergies:  Does not bruise/bleed easily.   Psychiatric/Behavioral: Negative.          Medical History  History reviewed. No pertinent past medical history.      History reviewed. No pertinent family history.      Social History     Tobacco Use    Smoking status: Never    Smokeless tobacco: Never         No Known Allergies      Medications  "  Scheduled Medications   Medication Dose Frequency    aspirin  81 mg DAILY    atorvastatin  40 mg QHS    nitroglycerin  0.5 Inch Q6HRS    metoprolol tartrate  25 mg TWICE DAILY    senna-docusate  2 Tablet Q EVENING    nicotine  21 mg Daily-0600           Physical Exam    Body mass index is 27.62 kg/m².     BP (!) 157/110   Pulse 77   Temp 36.7 °C (98.1 °F) (Temporal)   Resp 18   Ht 1.6 m (5' 2.99\")   Wt 70.7 kg (155 lb 13.8 oz)   SpO2 92%      Vitals:    08/31/24 2139 09/01/24 0057 09/01/24 0448 09/01/24 0742   BP: (!) 143/97 (!) 133/92 (!) 147/101 (!) 157/110   Pulse: 77 74 72 77   Resp: 18 17 16 18   Temp: 36.7 °C (98.1 °F) 36.9 °C (98.4 °F) 37.1 °C (98.8 °F) 36.7 °C (98.1 °F)   TempSrc: Temporal Temporal Temporal Temporal   SpO2: 93% 92% 94% 92%   Weight: 70.7 kg (155 lb 13.8 oz)  70.7 kg (155 lb 13.8 oz)    Height:            Oxygen Therapy:  Pulse Oximetry: 92 %, O2 (LPM): 0, O2 Delivery Device: None - Room Air      Physical Exam  Constitutional:       Appearance: Normal appearance.   HENT:      Head: Normocephalic and atraumatic.   Cardiovascular:      Rate and Rhythm: Normal rate and regular rhythm.      Heart sounds: No murmur heard.     No gallop.   Pulmonary:      Effort: Pulmonary effort is normal.      Breath sounds: Normal breath sounds.   Abdominal:      General: There is no distension.      Palpations: Abdomen is soft.   Musculoskeletal:      Right lower leg: No edema.      Left lower leg: No edema.   Skin:     General: Skin is warm and dry.      Coloration: Skin is not pale.      Comments: Right radial site is without evidence of hematoma   Neurological:      Mental Status: He is alert and oriented to person, place, and time.   Psychiatric:         Mood and Affect: Mood normal.         Behavior: Behavior normal.         Judgment: Judgment normal.               Labs (personally reviewed):     Estimated Creatinine Clearance: 101.1 mL/min (by C-G formula based on SCr of 0.78 mg/dL).    No results " "found for: \"BNPBTYPENAT\"  Recent Labs     08/31/24 2041 09/01/24  0100   CREATININE 0.78 0.78   BUN 14 14   POTASSIUM 4.0 3.7   SODIUM 141 139   CALCIUM 9.3 9.1   MAGNESIUM  --  2.1   CO2 23 22   ALBUMIN 3.9 3.7     Recent Labs     08/31/24 2041 09/01/24  0100   GLUCOSE 96 108*     Recent Labs     08/31/24 2041 09/01/24  0100   ASTSGOT 26 27   ALTSGPT 32 28   ALKPHOSPHAT 96 92   INR 0.98  --      Recent Labs     08/31/24 2041 09/01/24  0100   WBC 10.1 9.8   HEMOGLOBIN 15.3 14.6   PLATELETCT 279 261     Recent Labs     08/31/24 2041 08/31/24 2234 09/01/24  0100   TROPONINT 82* 87* 81*   NTPROBNP <36  --   --    HBA1C  --   --  5.8*     Lab Results   Component Value Date/Time    CHOLSTRLTOT 215 (H) 09/01/2024 01:00 AM     (H) 09/01/2024 01:00 AM    HDL 46 09/01/2024 01:00 AM    TRIGLYCERIDE 159 (H) 09/01/2024 01:00 AM       Imaging:  DX-CHEST-PORTABLE (1 VIEW)   Final Result      1.  No acute cardiac or pulmonary abnormalities are identified.   2.  Dilated loop of small bowel in the left upper quadrant. This could reflect ileus or small bowel obstruction.      EC-ECHOCARDIOGRAM COMPLETE W/ CONT    (Results Pending)   CL-LEFT HEART CATHETERIZATION WITH POSSIBLE INTERVENTION    (Results Pending)       Cardiac Imaging and Procedures Review      Echocardiogram 9/1/2024    No prior study is available for comparison.   Normal left ventricular chamber size. Normal left ventricular wall   thickness. Grossly normal left ventricular systolic function. The left   ventricular ejection fraction is visually estimated to be 55-60%.   Normal regional wall motion. Normal diastolic function.  No significant valvular disease    Cardiac Catheterization 9/1/2024 by Dr. Kiser :  Indication for procedure: Non-ST elevation MI     Procedures:  Coronary arteriograms  Left heart catheterization, left ventriculogram  IVUS guided PCI of proximal circumflex artery into large marginal branch using 3.5 x 32 mm Synergy drug-eluting " stent  IVUS guided PCI of mid LAD using 3.5 x 38 mm Synergy drug-eluting stent     Final diagnosis:   two vessel coronary artery disease.  Successful percutaneous intervention of left anterior descending artery, left circumflex coronary arteries.     Recommendations: Guideline directed medical therapy and risk factor management        Coronary arteriograms:  Left main: normal  Left anterior descending: Mild disease in the proximal portion, midportion has diffuse moderate disease with focal 90% stenosis just after dominant first diagonal takeoff.  Left circumflex: Continues as 1 large marginal branch, diffuse disease in the proximal portion into marginal branch with a focal 99% stenosis.  Right coronary: Luminal irregularities, 50% stenosis in distal portion, dominant     Left Heart Catheterization:  Left Ventriculogram: ejection fraction 60%  Left Ventricular EDP: 20 mm Hg   Aortic Valve Gradient: No significant AV gradient noted      Principal Problem:    ACS (acute coronary syndrome) ([facility]) (POA: Yes)  Active Problems:    NSTEMI (non-ST elevated myocardial infarction) ([facility]) (POA: Unknown)    Tobacco abuse counseling (POA: Unknown)    Hypertension (POA: Unknown)  Resolved Problems:    * No resolved hospital problems. *      Assessment and Medical Decision Making:    #.  NSTEMI  # .  Coronary artery disease  #.  Hypertensive emergency  #.  Chronic tobacco abuse      Recommendations:  NSTEMI    -[facility] 9/1/2024, found to have two-vessel coronary artery disease    - s/p PCI to proximal circumflex artery into large marginal branch (3.5 x 32 mm Synergy ARISTIDES),    - s/p PCI of mid LAD using 3.5 x 38 mm Synergy ARISTIDES.   -ASA  -Prasugrel 10 mg daily  LV gram 60%.    Echocardiogram demonstrated preserved EF 55-60%  -HI Statin, atorvastatin 40 mg nightly for goal LDL less than 70  -BB  -change metoprolol to carvedilol 6.25 mg twice daily for better blood pressure support  -Continue losartan 50 mg daily   Goal BP consistently below  130/80    -Meds-to-beds on discharge  -Cardiac rehab referral placed  Education   -Radial site care instructions provided  -Discussed returning to ER if chest pain returns and/or call cardiology office at [phone] with any additional new or worsening symptoms  -Discussed CV risk factor modification including cholesterol management, blood pressure management, smoking cessation, diet and lifestyle changes.     -Encourage tobacco cessation        Disposition: -Cardiology will sign off .  Patient to discharge today.  Patient will go back to [place] [facility] within a day or 2 and will follow-up with his primary care locally        No future appointments.     I personally discussed his case with  Dr Landy Johnson D.O.    Please contact me with any questions.  Thank you for allowing us to participate in the care of Mr. Curtis Sagastume.      Please see Dr. King  attestation for further details and MDM.     I, DOROTHEA Archuleta performed a portion of the service face-to-face with the same  patient on the same date of service independently of Dr. King FOR 15 minutes preparing to see the patient, reviewing hospital notes and tests, obtaining history from the patient, performing a medically appropriate exam, counseling and educating the patient, ordering medications/tests/procedures/referrals as clinically indicated, and documenting information in the electronic medical record.    Please note this dictation was created using voice recognition software.  I have made every reasonable attempt to correct obvious errors, but there may be errors of grammar and possibly content that I did not discover before finalizing the note.    DOROTHEA Archuleta  [facility]  [phone]

## 2024-09-01 NOTE — ASSESSMENT & PLAN NOTE
Reported smoking 1 pack of cigarettes daily  Tobacco smoking cessation  provided: 11 minutes  We discussed tobacco smoking cessation given concern for acute coronary syndrome, NSTEMI, high probability of going to cardiogenic shock and cardiac arrest and eventual death.  Given acute heart attack, discussed the need for absolute tobacco smoking cessation.  He expressed understanding.  Provided patient with standard tobacco cessation information per protocol.  Offered nicotine patch, nicotine gum, Chantix as alternative.

## 2024-09-01 NOTE — PROGRESS NOTES
Bedside report received and care assumed. Pt transported to T8 on long pat, RA, no active chest pain. Upon arrival pt A&Ox4, hooked up to tele box and monitors notified. Some ST elevation noted to same leads as in EKG. Discussed ST elevation, EKG, and CXR w/ Dr Cerrato in ER at bedside, MD aware and stated cards aware w/ plans for cath in AM, no additional orders at this time. Pt educated on safety, fall precautions, call light.  used for assistance w/ admit profile and assessment. Pt declines any questions or concerns at this time. Spouse at bedside.

## 2024-09-01 NOTE — TELEPHONE ENCOUNTER
[facility] CARDIOLOGY TRIAGE REPORT  Facility: [place]  Physician: Belle Shrestha MD ER physician  Pertinent history & patient course: 48-year-old male who presents with recurrent chest pain and severe hypertension -200/120 and elevated troponin level.  Developed chest pain during intercourse with shortness of breath and diaphoresis resolving after 7 minutes last evening.  Came up to [facility].  Took a short walk today developed chest pain that lasted 10 minutes and resolved prior to presentation to the ER.  Has had no recurrent chest pain or shortness of breath.  In the ER BP was 180-200 systolic.  EKG showed sinus rhythm with concern for anterior ST elevation which looks like early repolarization with no reciprocal changes.  Has been given metoprolol 50 mg p.o. and IV metoprolol 15 mg and 324 of aspirin.  Nitropaste is being started.  Impression: ACS, hypertensive emergency.  Recommended  IV labetalol for additional hypertension control   start IV heparin per ACS protocol.  Continue Nitropaste.  Transfer to [facility]    Patient accepted for transfer: Excepted in transfer  Consultants to be called upon arrival: Cardiology     Please inform the cardiologist upon arrival of the patient to [facility].

## 2024-09-01 NOTE — ASSESSMENT & PLAN NOTE
Chest pain, NSTEMI  Aspirin, statin, metoprolol  Nitropaste  As needed nitro SL, morphine  Heparin drip  Check echo    Cardiology consulted, tentative plan for heart catheterization

## 2024-09-02 ENCOUNTER — PHARMACY VISIT (OUTPATIENT)
Dept: PHARMACY | Facility: MEDICAL CENTER | Age: 49
End: 2024-09-02
Payer: COMMERCIAL

## 2024-09-02 VITALS
TEMPERATURE: 98.2 F | SYSTOLIC BLOOD PRESSURE: 128 MMHG | WEIGHT: 152.34 LBS | HEIGHT: 63 IN | RESPIRATION RATE: 15 BRPM | BODY MASS INDEX: 26.99 KG/M2 | HEART RATE: 77 BPM | OXYGEN SATURATION: 94 % | DIASTOLIC BLOOD PRESSURE: 90 MMHG

## 2024-09-02 LAB
ALBUMIN SERPL BCP-MCNC: 3.8 G/DL (ref 3.2–4.9)
BUN SERPL-MCNC: 13 MG/DL (ref 8–22)
CALCIUM ALBUM COR SERPL-MCNC: 9.4 MG/DL (ref 8.5–10.5)
CALCIUM SERPL-MCNC: 9.2 MG/DL (ref 8.5–10.5)
CHLORIDE SERPL-SCNC: 103 MMOL/L (ref 96–112)
CO2 SERPL-SCNC: 22 MMOL/L (ref 20–33)
CREAT SERPL-MCNC: 0.87 MG/DL (ref 0.5–1.4)
ERYTHROCYTE [DISTWIDTH] IN BLOOD BY AUTOMATED COUNT: 44.2 FL (ref 35.9–50)
GFR SERPLBLD CREATININE-BSD FMLA CKD-EPI: 105 ML/MIN/1.73 M 2
GLUCOSE SERPL-MCNC: 106 MG/DL (ref 65–99)
HCT VFR BLD AUTO: 46.5 % (ref 42–52)
HGB BLD-MCNC: 15.7 G/DL (ref 14–18)
MCH RBC QN AUTO: 30.7 PG (ref 27–33)
MCHC RBC AUTO-ENTMCNC: 33.8 G/DL (ref 32.3–36.5)
MCV RBC AUTO: 90.8 FL (ref 81.4–97.8)
PHOSPHATE SERPL-MCNC: 5 MG/DL (ref 2.5–4.5)
PLATELET # BLD AUTO: 255 K/UL (ref 164–446)
PMV BLD AUTO: 9 FL (ref 9–12.9)
POTASSIUM SERPL-SCNC: 3.6 MMOL/L (ref 3.6–5.5)
RBC # BLD AUTO: 5.12 M/UL (ref 4.7–6.1)
SODIUM SERPL-SCNC: 137 MMOL/L (ref 135–145)
WBC # BLD AUTO: 12.4 K/UL (ref 4.8–10.8)

## 2024-09-02 PROCEDURE — A9270 NON-COVERED ITEM OR SERVICE: HCPCS | Performed by: INTERNAL MEDICINE

## 2024-09-02 PROCEDURE — 80069 RENAL FUNCTION PANEL: CPT

## 2024-09-02 PROCEDURE — 99233 SBSQ HOSP IP/OBS HIGH 50: CPT | Mod: FS | Performed by: INTERNAL MEDICINE

## 2024-09-02 PROCEDURE — 85027 COMPLETE CBC AUTOMATED: CPT

## 2024-09-02 PROCEDURE — 99239 HOSP IP/OBS DSCHRG MGMT >30: CPT | Performed by: INTERNAL MEDICINE

## 2024-09-02 PROCEDURE — 700102 HCHG RX REV CODE 250 W/ 637 OVERRIDE(OP): Performed by: STUDENT IN AN ORGANIZED HEALTH CARE EDUCATION/TRAINING PROGRAM

## 2024-09-02 PROCEDURE — A9270 NON-COVERED ITEM OR SERVICE: HCPCS | Performed by: STUDENT IN AN ORGANIZED HEALTH CARE EDUCATION/TRAINING PROGRAM

## 2024-09-02 PROCEDURE — 700102 HCHG RX REV CODE 250 W/ 637 OVERRIDE(OP): Performed by: INTERNAL MEDICINE

## 2024-09-02 PROCEDURE — RXMED WILLOW AMBULATORY MEDICATION CHARGE: Performed by: INTERNAL MEDICINE

## 2024-09-02 PROCEDURE — 700102 HCHG RX REV CODE 250 W/ 637 OVERRIDE(OP)

## 2024-09-02 PROCEDURE — 36415 COLL VENOUS BLD VENIPUNCTURE: CPT

## 2024-09-02 PROCEDURE — A9270 NON-COVERED ITEM OR SERVICE: HCPCS

## 2024-09-02 RX ORDER — CARVEDILOL 6.25 MG/1
6.25 TABLET ORAL 2 TIMES DAILY WITH MEALS
Qty: 60 TABLET | Refills: 0 | Status: SHIPPED | OUTPATIENT
Start: 2024-09-02

## 2024-09-02 RX ORDER — PRASUGREL 10 MG/1
10 TABLET, FILM COATED ORAL DAILY
Qty: 30 TABLET | Refills: 0 | Status: SHIPPED | OUTPATIENT
Start: 2024-09-03

## 2024-09-02 RX ORDER — ATORVASTATIN CALCIUM 40 MG/1
40 TABLET, FILM COATED ORAL
Qty: 30 TABLET | Refills: 0 | Status: SHIPPED | OUTPATIENT
Start: 2024-09-02

## 2024-09-02 RX ORDER — ASPIRIN 81 MG/1
81 TABLET ORAL DAILY
Qty: 30 TABLET | Refills: 0 | Status: SHIPPED | OUTPATIENT
Start: 2024-09-03

## 2024-09-02 RX ORDER — CARVEDILOL 6.25 MG/1
6.25 TABLET ORAL 2 TIMES DAILY WITH MEALS
Status: DISCONTINUED | OUTPATIENT
Start: 2024-09-02 | End: 2024-09-02 | Stop reason: HOSPADM

## 2024-09-02 RX ORDER — LOSARTAN POTASSIUM 50 MG/1
50 TABLET ORAL EVERY EVENING
Qty: 30 TABLET | Refills: 0 | Status: SHIPPED | OUTPATIENT
Start: 2024-09-02

## 2024-09-02 RX ADMIN — NICOTINE TRANSDERMAL SYSTEM 21 MG: 21 PATCH, EXTENDED RELEASE TRANSDERMAL at 05:16

## 2024-09-02 RX ADMIN — METOPROLOL TARTRATE 25 MG: 25 TABLET, FILM COATED ORAL at 05:16

## 2024-09-02 RX ADMIN — PRASUGREL 10 MG: 10 TABLET, FILM COATED ORAL at 05:17

## 2024-09-02 RX ADMIN — ASPIRIN 81 MG: 81 TABLET, COATED ORAL at 05:18

## 2024-09-02 ASSESSMENT — FIBROSIS 4 INDEX: FIB4 SCORE: 0.98

## 2024-09-02 NOTE — PROGRESS NOTES
Bedside report received from off going RN/tech: Geraldo assumed care of patient.     Fall Risk Score: LOW RISK  Fall risk interventions in place: Provide patient/family education based on risk assessment and Educate patient/family to call staff for assistance when getting out of bed  Bed type: Regular (Erasmo Score less than 17 interventions in place)  Patient on cardiac monitor: Yes  IVF/IV medications: Not Applicable   Oxygen: Room Air  Bedside sitter: Not Applicable   Isolation: Not applicable

## 2024-09-02 NOTE — DISCHARGE SUMMARY
Discharge Summary    CHIEF COMPLAINT ON ADMISSION  Chief Complaint   Patient presents with    Chest Pain     Pt states he has had 3 episodes of sudden onset mid sternal CP that radiates to his R shoulder and arm since yesterday -sob       Reason for Admission  ems     Admission Date  8/31/2024    CODE STATUS  Full Code    HPI & HOSPITAL COURSE  This is a 48 y.o. male here with chest pain      48 y.o. male with history of tobacco abuse who presented 8/31/2024 with evaluation for chest pain.  Patient initially presented to [facility] emergency room for chest pain and uncontrolled hypertension.     Patient is visiting from [facility] to [facility] for the [place] weekend.  [place] to have substernal chest pain radiating to right upper extremity and jaw.  Patient went to ER for evaluation.  In [facility] ER, noted to -200, as well as elevated troponin.  No definitive ST elevation seen.  Patient was initially planned as direct admit to [facility].  However, condition declining, persistent chest pain, sent to [facility] ER.     Repeat troponin at [facility] ER is 82.  Due to concern of symptoms or acute coronary syndrome, cardiology was consulted.  Patient was evaluated by cardiology, recommended optimizing CAD meds, heparin drip.  Patient taken for cardiac cath 9/1 2 stents were placed in the mid LAD and Lcx.   Started aspirin, Effient, statin, metoprolol, losartan.  Echo showed preserved EF.  Patient's vital signs are stable and cardiology has cleared the patient for discharge.  He is to follow-up with outpatient cardiology closer to his home as well as his primary care physician for further management.    Therefore, he is discharged in good and stable condition to home with close outpatient follow-up.    The patient met 2-midnight criteria for an inpatient stay at the time of discharge.    Discharge Date  9/2/2024    FOLLOW UP ITEMS POST DISCHARGE  Follow up with  cardiology and primary care     DISCHARGE DIAGNOSES  Principal Problem:    ACS (acute coronary syndrome) (HCC) (POA: Yes)  Active Problems:    NSTEMI (non-ST elevated myocardial infarction) (HCC) (POA: Unknown)    Tobacco abuse counseling (POA: Unknown)    Hypertension (POA: Unknown)  Resolved Problems:    * No resolved hospital problems. *      FOLLOW UP  No future appointments.  Primary care    Follow up  Follow-up with primary care in 1 to 2 weeks    [facility]  [address]  [place] [id number]  Follow up  Follow up within 1 week.      MEDICATIONS ON DISCHARGE     Medication List        START taking these medications        Instructions   Aspirin Low Dose 81 MG EC tablet  Start taking on: September 3, 2024  Generic drug: aspirin   Wilmar 1 tableta por vía oral diariamente.  (Take 1 Tablet by mouth every day.)  Dose: 81 mg     atorvastatin 40 MG Tabs  Commonly known as: Lipitor   Wilmar 1 tableta por vía oral antes de acostarse.  (Take 1 Tablet by mouth at bedtime.)  Dose: 40 mg     carvedilol 6.25 MG Tabs  Commonly known as: Coreg   Wilmar 1 tableta por vía oral dos veces al día con las comidas.  (Take 1 Tablet by mouth 2 times a day with meals.)  Dose: 6.25 mg     losartan 50 MG Tabs  Commonly known as: Cozaar   Take 1 Tablet by mouth every evening.  Dose: 50 mg     prasugrel 10 MG Tabs  Start taking on: September 3, 2024  Commonly known as: Effient   Wilmar 1 tableta por vía oral diariamente.  (Take 1 Tablet by mouth every day.)  Dose: 10 mg              Allergies  No Known Allergies    DIET  Orders Placed This Encounter   Procedures    Diet Order Diet: Cardiac     Standing Status:   Standing     Number of Occurrences:   1     Order Specific Question:   Diet:     Answer:   Cardiac [6]       ACTIVITY  As tolerated.  Weight bearing as tolerated    CONSULTATIONS  Cardiology     PROCEDURES  9/1/2024  Procedures:  Coronary arteriograms  Left heart catheterization, left ventriculogram  IVUS guided  PCI of proximal circumflex artery into large marginal branch using 3.5 x 32 mm Synergy drug-eluting stent  IVUS guided PCI of mid LAD using 3.5 x 38 mm Synergy drug-eluting stent    LABORATORY  Lab Results   Component Value Date    SODIUM 137 09/02/2024    POTASSIUM 3.6 09/02/2024    CHLORIDE 103 09/02/2024    CO2 22 09/02/2024    GLUCOSE 106 (H) 09/02/2024    BUN 13 09/02/2024    CREATININE 0.87 09/02/2024        Lab Results   Component Value Date    WBC 12.4 (H) 09/02/2024    HEMOGLOBIN 15.7 09/02/2024    HEMATOCRIT 46.5 09/02/2024    PLATELETCT 255 09/02/2024        Total time of the discharge process exceeds 32 minutes.

## 2024-09-02 NOTE — PROGRESS NOTES
Bedside report received from off going RN/tech: Argelia BANG, assumed care of patient.     Fall Risk Score: LOW RISK  Fall risk interventions in place: Place yellow fall risk ID band on patient, Provide patient/family education based on risk assessment, Educate patient/family to call staff for assistance when getting out of bed, Place fall precaution signage outside patient door, and Utilize bed/chair fall alarm  Bed type: Regular (Erasmo Score less than 17 interventions in place)  Patient on cardiac monitor: Yes  IVF/IV medications: Not Applicable   Oxygen: Room Air  Bedside sitter: Not Applicable   Isolation: Not applicable

## 2024-09-02 NOTE — PROGRESS NOTES
/128 Patient denies any symptoms. PRN Labetalol 10mg IV given. Will reassess in 15-30min. Will continue to monitor.

## 2024-09-02 NOTE — CARE PLAN
The patient is Stable - Low risk of patient condition declining or worsening    Shift Goals  Clinical Goals: hemodynamic stability  Patient Goals: rest  Family Goals: at bedside    Problem: Knowledge Deficit - Standard  Goal: Patient and family/care givers will demonstrate understanding of plan of care, disease process/condition, diagnostic tests and medications  Outcome: Progressing     Problem: Hemodynamics  Goal: Patient's hemodynamics, fluid balance and neurologic status will be stable or improve  Outcome: Progressing     Problem: Fluid Volume  Goal: Fluid volume balance will be maintained  Outcome: Progressing     Problem: Acute Care of the Cardiac Cath Patient  Goal: Post Procedure Optimal Outcome for the Cardiac Cath Patient  Outcome: Progressing

## 2024-09-02 NOTE — DISCHARGE PLANNING
Case Management Discharge Planning    Admission Date: 8/31/2024  GMLOS: 2.4  ALOS: 2    6-Clicks ADL Score: 24  6-Clicks Mobility Score: 24      Anticipated Discharge Dispo:      DME Needed: No    Action(s) Taken: OTHER  Approved services completed for meds to bed. Patient unable to cover cost.     Escalations Completed: None    Medically Clear: Yes    Next Steps: Continue to follow for CM needs.    Barriers to Discharge: None    Is the patient up for discharge tomorrow: No

## 2024-09-25 ENCOUNTER — APPOINTMENT (OUTPATIENT)
Dept: RADIOLOGY | Facility: MEDICAL CENTER | Age: 49
End: 2024-09-25
Attending: EMERGENCY MEDICINE
Payer: OTHER MISCELLANEOUS